# Patient Record
Sex: FEMALE | Race: WHITE | NOT HISPANIC OR LATINO | Employment: FULL TIME | URBAN - METROPOLITAN AREA
[De-identification: names, ages, dates, MRNs, and addresses within clinical notes are randomized per-mention and may not be internally consistent; named-entity substitution may affect disease eponyms.]

---

## 2023-03-02 PROBLEM — J30.2 SEASONAL ALLERGIES: Status: ACTIVE | Noted: 2023-03-02

## 2023-03-02 PROBLEM — K21.9 GASTROESOPHAGEAL REFLUX DISEASE: Status: ACTIVE | Noted: 2023-03-02

## 2023-03-02 PROBLEM — R79.89 ABNORMAL CBC: Status: ACTIVE | Noted: 2023-03-02

## 2023-03-02 PROBLEM — N93.9 ABNORMAL UTERINE BLEEDING: Status: ACTIVE | Noted: 2023-03-02

## 2023-03-02 PROBLEM — G47.00 INSOMNIA: Status: ACTIVE | Noted: 2023-03-02

## 2023-03-02 PROBLEM — N63.10 BREAST MASS, RIGHT: Status: ACTIVE | Noted: 2023-03-02

## 2023-03-02 PROBLEM — U07.1 COVID-19: Status: ACTIVE | Noted: 2023-03-02

## 2023-03-02 PROBLEM — G25.0 ESSENTIAL TREMOR: Status: ACTIVE | Noted: 2023-03-02

## 2023-03-02 PROBLEM — K58.1 IRRITABLE BOWEL SYNDROME WITH CONSTIPATION: Status: ACTIVE | Noted: 2023-03-02

## 2023-03-02 PROBLEM — F40.243 ANXIETY WITH FLYING: Status: ACTIVE | Noted: 2023-03-02

## 2023-03-02 PROBLEM — G43.009 MIGRAINE WITHOUT AURA AND WITHOUT STATUS MIGRAINOSUS, NOT INTRACTABLE: Status: ACTIVE | Noted: 2023-03-02

## 2023-03-02 PROBLEM — R92.8 ABNORMAL FINDING ON BREAST IMAGING: Status: ACTIVE | Noted: 2023-03-02

## 2023-03-02 RX ORDER — ALPRAZOLAM 0.25 MG/1
TABLET ORAL
COMMUNITY
Start: 2022-06-15 | End: 2023-03-15 | Stop reason: ALTCHOICE

## 2023-03-02 RX ORDER — PANTOPRAZOLE SODIUM 40 MG/1
TABLET, DELAYED RELEASE ORAL
COMMUNITY
Start: 2022-06-15 | End: 2023-03-15 | Stop reason: ALTCHOICE

## 2023-03-02 RX ORDER — DICYCLOMINE HYDROCHLORIDE 20 MG/1
TABLET ORAL
COMMUNITY
Start: 2022-06-15 | End: 2023-03-15 | Stop reason: ALTCHOICE

## 2023-03-02 RX ORDER — TRAZODONE HYDROCHLORIDE 50 MG/1
TABLET ORAL
COMMUNITY
Start: 2022-06-15 | End: 2024-05-08 | Stop reason: ALTCHOICE

## 2023-03-02 RX ORDER — SUMATRIPTAN SUCCINATE 100 MG/1
TABLET ORAL
COMMUNITY
Start: 2018-09-05 | End: 2024-05-08 | Stop reason: WASHOUT

## 2023-03-02 RX ORDER — PROPRANOLOL HYDROCHLORIDE 20 MG/1
TABLET ORAL
COMMUNITY
Start: 2016-10-17 | End: 2024-03-27

## 2023-03-02 RX ORDER — MONTELUKAST SODIUM 10 MG/1
TABLET ORAL
COMMUNITY
Start: 2022-06-15 | End: 2024-04-23 | Stop reason: WASHOUT

## 2023-03-14 NOTE — PROGRESS NOTES
"Subjective   Patient ID: Zoe Wong is a 37 y.o. female who presents for Palpitations (About a month ago she started having fluttering in her chest with SOB).    Poc ekg    Last physical:  6/15/22  Has pt had a pap between 12/2022 and now?  When was last mammo?  Last one in [facility] was 5/2021  Last labs- 7/2022 lipid nl, cbc nl; 6/2022 tsh nl, cmp nl        HPI  Fluttering in chest with sob and neck pain and fatigue x 1mon  Every 2-10min; lasts a second  Not feel if doing something only if sitting  No cp or heart racing  Worse with food and stress  radiate to back, neck, jaw, shoulder, 1 arm or both arms  no sudden onset of severe chest or back pain, described as \"tearing\"   no sudden difficulty speaking, facial drooping, confusion, or drooling  bps at home-90-100s/60s  no cold sweats, dizziness/lightheaded, weakness/paralysis 1 side of body, loss of consciousness, n/v, pain with deep breath or cough, vision change, HA, edema  no leg pain or difficulty walking  no fall or injury  no new work or exercise routine  no new stresses  no rash  no heartburn, gas, belching, bloating, abd pain, trouble swallowing  does not hurt to touch chest  FH heart attack or heart surgery or other heart issues-none  does not have a cardio dr  no sx like this before  no diabetes or htn  Before this started saw dentist and local anesthesia and heart rate incr            No care team member to display     Review of Systems   Constitutional:  Negative for chills and fever.   Respiratory:  Positive for shortness of breath. Negative for cough and wheezing.    Cardiovascular:  Positive for palpitations. Negative for chest pain.   Neurological:  Positive for headaches. Negative for dizziness.       Objective   Visit Vitals  /73   Pulse 86   Temp 36.2 °C (97.2 °F)      BP Readings from Last 3 Encounters:   03/15/23 105/73   06/15/22 110/64   11/11/21 115/80     Wt Readings from Last 3 Encounters:   03/15/23 60.7 kg (133 lb 12.8 oz) "   06/15/22 59.9 kg (132 lb 2 oz)   11/11/21 58.1 kg (128 lb)       Physical Exam  Constitutional:       Appearance: Normal appearance.   Cardiovascular:      Rate and Rhythm: Normal rate and regular rhythm.      Heart sounds: Normal heart sounds.   Pulmonary:      Effort: Pulmonary effort is normal.      Breath sounds: Normal breath sounds. No wheezing, rhonchi or rales.   Neurological:      Mental Status: She is alert.         Assessment/Plan   Diagnoses and all orders for this visit:  Palpitations  -     ECG 12 lead  -     Comprehensive Metabolic Panel; Future  -     CBC and Auto Differential; Future  -     TSH with reflex to Free T4 if abnormal; Future  -     Referral to Cardiology; Future  SOB (shortness of breath)  -     XR chest 2 views; Future

## 2023-03-15 ENCOUNTER — LAB (OUTPATIENT)
Dept: LAB | Facility: LAB | Age: 38
End: 2023-03-15
Payer: COMMERCIAL

## 2023-03-15 ENCOUNTER — OFFICE VISIT (OUTPATIENT)
Dept: PRIMARY CARE | Facility: CLINIC | Age: 38
End: 2023-03-15
Payer: COMMERCIAL

## 2023-03-15 VITALS
HEIGHT: 64 IN | WEIGHT: 133.8 LBS | SYSTOLIC BLOOD PRESSURE: 105 MMHG | HEART RATE: 86 BPM | TEMPERATURE: 97.2 F | DIASTOLIC BLOOD PRESSURE: 73 MMHG | BODY MASS INDEX: 22.84 KG/M2

## 2023-03-15 DIAGNOSIS — R00.2 PALPITATIONS: Primary | ICD-10-CM

## 2023-03-15 DIAGNOSIS — R00.2 PALPITATIONS: ICD-10-CM

## 2023-03-15 DIAGNOSIS — R06.02 SOB (SHORTNESS OF BREATH): ICD-10-CM

## 2023-03-15 LAB
ALANINE AMINOTRANSFERASE (SGPT) (U/L) IN SER/PLAS: 14 U/L (ref 7–45)
ALBUMIN (G/DL) IN SER/PLAS: 4.3 G/DL (ref 3.4–5)
ALKALINE PHOSPHATASE (U/L) IN SER/PLAS: 50 U/L (ref 33–110)
ANION GAP IN SER/PLAS: 9 MMOL/L (ref 10–20)
ASPARTATE AMINOTRANSFERASE (SGOT) (U/L) IN SER/PLAS: 17 U/L (ref 9–39)
BASOPHILS (10*3/UL) IN BLOOD BY AUTOMATED COUNT: 0.05 X10E9/L (ref 0–0.1)
BASOPHILS/100 LEUKOCYTES IN BLOOD BY AUTOMATED COUNT: 0.7 % (ref 0–2)
BILIRUBIN TOTAL (MG/DL) IN SER/PLAS: 0.4 MG/DL (ref 0–1.2)
CALCIUM (MG/DL) IN SER/PLAS: 9 MG/DL (ref 8.6–10.3)
CARBON DIOXIDE, TOTAL (MMOL/L) IN SER/PLAS: 29 MMOL/L (ref 21–32)
CHLORIDE (MMOL/L) IN SER/PLAS: 103 MMOL/L (ref 98–107)
CREATININE (MG/DL) IN SER/PLAS: 0.63 MG/DL (ref 0.5–1.05)
EOSINOPHILS (10*3/UL) IN BLOOD BY AUTOMATED COUNT: 0.2 X10E9/L (ref 0–0.7)
EOSINOPHILS/100 LEUKOCYTES IN BLOOD BY AUTOMATED COUNT: 2.8 % (ref 0–6)
ERYTHROCYTE DISTRIBUTION WIDTH (RATIO) BY AUTOMATED COUNT: 13.6 % (ref 11.5–14.5)
ERYTHROCYTE MEAN CORPUSCULAR HEMOGLOBIN CONCENTRATION (G/DL) BY AUTOMATED: 31.3 G/DL (ref 32–36)
ERYTHROCYTE MEAN CORPUSCULAR VOLUME (FL) BY AUTOMATED COUNT: 98 FL (ref 80–100)
ERYTHROCYTES (10*6/UL) IN BLOOD BY AUTOMATED COUNT: 3.92 X10E12/L (ref 4–5.2)
GFR FEMALE: >90 ML/MIN/1.73M2
GLUCOSE (MG/DL) IN SER/PLAS: 77 MG/DL (ref 74–99)
HEMATOCRIT (%) IN BLOOD BY AUTOMATED COUNT: 38.4 % (ref 36–46)
HEMOGLOBIN (G/DL) IN BLOOD: 12 G/DL (ref 12–16)
IMMATURE GRANULOCYTES/100 LEUKOCYTES IN BLOOD BY AUTOMATED COUNT: 0.1 % (ref 0–0.9)
LEUKOCYTES (10*3/UL) IN BLOOD BY AUTOMATED COUNT: 7.1 X10E9/L (ref 4.4–11.3)
LYMPHOCYTES (10*3/UL) IN BLOOD BY AUTOMATED COUNT: 2.39 X10E9/L (ref 1.2–4.8)
LYMPHOCYTES/100 LEUKOCYTES IN BLOOD BY AUTOMATED COUNT: 33.5 % (ref 13–44)
MONOCYTES (10*3/UL) IN BLOOD BY AUTOMATED COUNT: 0.52 X10E9/L (ref 0.1–1)
MONOCYTES/100 LEUKOCYTES IN BLOOD BY AUTOMATED COUNT: 7.3 % (ref 2–10)
NEUTROPHILS (10*3/UL) IN BLOOD BY AUTOMATED COUNT: 3.96 X10E9/L (ref 1.2–7.7)
NEUTROPHILS/100 LEUKOCYTES IN BLOOD BY AUTOMATED COUNT: 55.6 % (ref 40–80)
NRBC (PER 100 WBCS) BY AUTOMATED COUNT: 0 /100 WBC (ref 0–0)
PLATELETS (10*3/UL) IN BLOOD AUTOMATED COUNT: 376 X10E9/L (ref 150–450)
POTASSIUM (MMOL/L) IN SER/PLAS: 4.2 MMOL/L (ref 3.5–5.3)
PROTEIN TOTAL: 7 G/DL (ref 6.4–8.2)
SODIUM (MMOL/L) IN SER/PLAS: 137 MMOL/L (ref 136–145)
THYROTROPIN (MIU/L) IN SER/PLAS BY DETECTION LIMIT <= 0.05 MIU/L: 1.81 MIU/L (ref 0.44–3.98)
UREA NITROGEN (MG/DL) IN SER/PLAS: 12 MG/DL (ref 6–23)

## 2023-03-15 PROCEDURE — 93000 ELECTROCARDIOGRAM COMPLETE: CPT | Performed by: NURSE PRACTITIONER

## 2023-03-15 PROCEDURE — 99214 OFFICE O/P EST MOD 30 MIN: CPT | Performed by: NURSE PRACTITIONER

## 2023-03-15 PROCEDURE — 85025 COMPLETE CBC W/AUTO DIFF WBC: CPT

## 2023-03-15 PROCEDURE — 80053 COMPREHEN METABOLIC PANEL: CPT

## 2023-03-15 PROCEDURE — 1036F TOBACCO NON-USER: CPT | Performed by: NURSE PRACTITIONER

## 2023-03-15 PROCEDURE — 84443 ASSAY THYROID STIM HORMONE: CPT

## 2023-03-15 PROCEDURE — 36415 COLL VENOUS BLD VENIPUNCTURE: CPT

## 2023-03-15 ASSESSMENT — ENCOUNTER SYMPTOMS
COUGH: 0
HEADACHES: 1
PALPITATIONS: 1
WHEEZING: 0
SHORTNESS OF BREATH: 1
CHILLS: 0
DIZZINESS: 0
FEVER: 0

## 2023-03-15 ASSESSMENT — PATIENT HEALTH QUESTIONNAIRE - PHQ9
1. LITTLE INTEREST OR PLEASURE IN DOING THINGS: NOT AT ALL
SUM OF ALL RESPONSES TO PHQ9 QUESTIONS 1 AND 2: 0
2. FEELING DOWN, DEPRESSED OR HOPELESS: NOT AT ALL

## 2023-03-15 NOTE — PATIENT INSTRUCTIONS
EKG today-normal  Chest xray today-results in 2-3d    Labs today-results in 2-3d  Lab orders placed. You can use the lab in our office today. Results will be back in 2-3 business days for most labs. It is always recommended for any orders (labs, xrays, ultrasounds,MRI, ct scan, procedures etc) to check with your insurance provider for expected costs or expenses to you.     Use the lab in our building when fasting; fasting is no food, drink, gum or mints other than water for 8-12 hrs. No appt necessary. Their hrs are: Monday-Thursday 7a-6p, Friday 7a-5p, and Saturday 8a-12p. Results will be back in 2-3 business days for most labs. It is always recommended for any orders (labs, xrays, ultrasounds,MRI, ct scan, procedures etc) to check with your insurance provider for expected costs or expenses to you.     If chest pain continues then I will order a stress test and refer to a cardiologist.    You will get your results via phone from my medical assistant if you do not have [facility].  OR  You will get your results via the [facility] patient portal.    If a result is urgent, I will call to speak to you.        I will communicate with you via [facility] regarding messages and results. If you need help with this, you can call the support line at [phone].    IT WAS A PLEASURE TO SEE YOU TODAY. THANK YOU FOR CHOOSING US FOR YOUR HEALTHCARE NEEDS.

## 2023-08-24 ENCOUNTER — TELEPHONE (OUTPATIENT)
Dept: PRIMARY CARE | Facility: CLINIC | Age: 38
End: 2023-08-24
Payer: COMMERCIAL

## 2023-08-24 NOTE — TELEPHONE ENCOUNTER
Called patient on 8/22/23 to schedule patient for a physical.  She scheduled her  and will call back to schedule herself.

## 2024-03-25 ENCOUNTER — OFFICE VISIT (OUTPATIENT)
Dept: OBSTETRICS AND GYNECOLOGY | Facility: CLINIC | Age: 39
End: 2024-03-25
Payer: COMMERCIAL

## 2024-03-25 VITALS
WEIGHT: 131.9 LBS | DIASTOLIC BLOOD PRESSURE: 66 MMHG | BODY MASS INDEX: 22.52 KG/M2 | SYSTOLIC BLOOD PRESSURE: 93 MMHG | HEIGHT: 64 IN

## 2024-03-25 DIAGNOSIS — Z01.419 PAP SMEAR, AS PART OF ROUTINE GYNECOLOGICAL EXAMINATION: ICD-10-CM

## 2024-03-25 DIAGNOSIS — Z01.419 ENCOUNTER FOR ANNUAL ROUTINE GYNECOLOGICAL EXAMINATION: Primary | ICD-10-CM

## 2024-03-25 DIAGNOSIS — R10.2 PELVIC PAIN: ICD-10-CM

## 2024-03-25 PROCEDURE — 1036F TOBACCO NON-USER: CPT | Performed by: OBSTETRICS & GYNECOLOGY

## 2024-03-25 PROCEDURE — 87624 HPV HI-RISK TYP POOLED RSLT: CPT

## 2024-03-25 PROCEDURE — 88175 CYTOPATH C/V AUTO FLUID REDO: CPT

## 2024-03-25 PROCEDURE — 99395 PREV VISIT EST AGE 18-39: CPT | Performed by: OBSTETRICS & GYNECOLOGY

## 2024-03-25 NOTE — PROGRESS NOTES
Subjective   Patient ID: Zoe Wong is a 38 y.o. female who presents for Annual Exam (Patient here for annual/Birth control= none/Declined chaperone).  HPI  Patient is a 38-year-old  2 para 1-0-1-1 whose had 1 spontaneous vaginal delivery with previous partner.  Her last menstrual period was  she said that typically 1 month apart she will bleed for about 7 days.  She said the first day is very painful she requires ibuprofen the remainder days manageable.  Currently not using anything for birth control.  She and her partner have been trying to get pregnant for about 7 years she does have an appointment with reproductive endocrinology in 2024.  She admits to regular bowel movements denies any urinary symptoms.  Status post appendectomy tonsils and adenoids and hemorrhoid surgery.  She denies cigarette smoking currently works as an RN.  Family history significant for maternal grandmother with breast cancer.  Patient states her Pap smears have been normal.  Patient states when growing up in [place] a physician told her she may have endometriosis.  She also states in her 20s was diagnosed with a pelvic infection possibly ovarian infection and treated with antibiotics.  Review of Systems    Objective   Physical Exam  Gen.: Alert and in no acute distress. Well-developed, well-nourished.  Thyroid: Nonenlarged and no palpable thyroid nodules  Cardiovascular: Heart regular rate and rhythm  Pulmonary: Clear bilateral breath sounds  Breasts: Normal appearance, no nipple discharge and no skin changes. No palpable masses and no axillary lymphadenopathy  Abdomen: Soft and nontender, no abdominal mass palpated, no organomegaly   Pelvic: External genitalia normal, Bartholin's urethral and Grandfield's glands normal. Vagina normal. Cervix normal. Uterus normal size and shape. Right adnexa normal without masses. Left adnexa normal without masses. Perianal area and normal.  Assessment/Plan   Annual GYN exam, Pap and  HPV done, will get pelvic ultrasound due to 1 year history of pelvic pain and bloating.  Will contact patient with results.  Patient will follow-up for her reproductive endocrinology consultation.         Kory Bacon MD 03/25/24 4:31 PM

## 2024-03-26 DIAGNOSIS — G25.0 ESSENTIAL TREMOR: ICD-10-CM

## 2024-03-27 RX ORDER — PROPRANOLOL HYDROCHLORIDE 20 MG/1
20 TABLET ORAL 2 TIMES DAILY
Qty: 180 TABLET | Refills: 1 | Status: SHIPPED | OUTPATIENT
Start: 2024-03-27 | End: 2024-05-08 | Stop reason: SDUPTHER

## 2024-04-10 ENCOUNTER — HOSPITAL ENCOUNTER (OUTPATIENT)
Dept: RADIOLOGY | Facility: HOSPITAL | Age: 39
Discharge: HOME | End: 2024-04-10
Payer: COMMERCIAL

## 2024-04-10 DIAGNOSIS — R10.2 PELVIC PAIN: ICD-10-CM

## 2024-04-10 PROCEDURE — 76830 TRANSVAGINAL US NON-OB: CPT | Performed by: RADIOLOGY

## 2024-04-10 PROCEDURE — 76856 US EXAM PELVIC COMPLETE: CPT | Performed by: RADIOLOGY

## 2024-04-10 PROCEDURE — 76856 US EXAM PELVIC COMPLETE: CPT

## 2024-04-23 ENCOUNTER — CONSULT (OUTPATIENT)
Dept: ENDOCRINOLOGY | Facility: CLINIC | Age: 39
End: 2024-04-23
Payer: COMMERCIAL

## 2024-04-23 VITALS
WEIGHT: 132.4 LBS | HEIGHT: 65 IN | DIASTOLIC BLOOD PRESSURE: 74 MMHG | SYSTOLIC BLOOD PRESSURE: 106 MMHG | HEART RATE: 67 BPM | BODY MASS INDEX: 22.06 KG/M2

## 2024-04-23 DIAGNOSIS — Z01.83 ENCOUNTER FOR RH BLOOD TYPING: ICD-10-CM

## 2024-04-23 DIAGNOSIS — N83.209 CYST OF OVARY, UNSPECIFIED LATERALITY: ICD-10-CM

## 2024-04-23 DIAGNOSIS — Z11.3 SCREENING FOR STDS (SEXUALLY TRANSMITTED DISEASES): ICD-10-CM

## 2024-04-23 DIAGNOSIS — Z11.59 ENCOUNTER FOR SCREENING FOR OTHER VIRAL DISEASES: ICD-10-CM

## 2024-04-23 DIAGNOSIS — Z01.812 ENCOUNTER FOR PREPROCEDURAL LABORATORY EXAMINATION: ICD-10-CM

## 2024-04-23 DIAGNOSIS — Z13.29 SCREENING FOR THYROID DISORDER: Primary | ICD-10-CM

## 2024-04-23 DIAGNOSIS — Z31.41 FERTILITY TESTING: ICD-10-CM

## 2024-04-23 LAB
ABO GROUP (TYPE) IN BLOOD: NORMAL
ANTIBODY SCREEN: NORMAL
BB ANTIBODY IDENTIFICATION: NORMAL
CASE #: NORMAL
HBV SURFACE AG SERPL QL IA: NONREACTIVE
HCV AB SER QL: NONREACTIVE
HIV 1+2 AB+HIV1 P24 AG SERPL QL IA: NONREACTIVE
RH FACTOR (ANTIGEN D): NORMAL
RUBV IGG SERPL IA-ACNC: 2.3 IA
RUBV IGG SERPL QL IA: POSITIVE
TREPONEMA PALLIDUM IGG+IGM AB [PRESENCE] IN SERUM OR PLASMA BY IMMUNOASSAY: NONREACTIVE
TSH SERPL-ACNC: 1.32 MIU/L (ref 0.44–3.98)
VARICELLA ZOSTER IGG INDEX: 1.8 IA
VZV IGG SER QL IA: POSITIVE

## 2024-04-23 PROCEDURE — 87340 HEPATITIS B SURFACE AG IA: CPT

## 2024-04-23 PROCEDURE — 99214 OFFICE O/P EST MOD 30 MIN: CPT | Performed by: NURSE PRACTITIONER

## 2024-04-23 PROCEDURE — 86870 RBC ANTIBODY IDENTIFICATION: CPT

## 2024-04-23 PROCEDURE — 87800 DETECT AGNT MULT DNA DIREC: CPT

## 2024-04-23 PROCEDURE — 86787 VARICELLA-ZOSTER ANTIBODY: CPT

## 2024-04-23 PROCEDURE — 86905 BLOOD TYPING RBC ANTIGENS: CPT

## 2024-04-23 PROCEDURE — 86850 RBC ANTIBODY SCREEN: CPT

## 2024-04-23 PROCEDURE — 99204 OFFICE O/P NEW MOD 45 MIN: CPT | Performed by: NURSE PRACTITIONER

## 2024-04-23 PROCEDURE — 87389 HIV-1 AG W/HIV-1&-2 AB AG IA: CPT

## 2024-04-23 PROCEDURE — 83516 IMMUNOASSAY NONANTIBODY: CPT

## 2024-04-23 PROCEDURE — 86317 IMMUNOASSAY INFECTIOUS AGENT: CPT

## 2024-04-23 PROCEDURE — 36415 COLL VENOUS BLD VENIPUNCTURE: CPT

## 2024-04-23 PROCEDURE — 86803 HEPATITIS C AB TEST: CPT

## 2024-04-23 PROCEDURE — 86900 BLOOD TYPING SEROLOGIC ABO: CPT

## 2024-04-23 PROCEDURE — 86901 BLOOD TYPING SEROLOGIC RH(D): CPT

## 2024-04-23 PROCEDURE — 86780 TREPONEMA PALLIDUM: CPT

## 2024-04-23 PROCEDURE — 84443 ASSAY THYROID STIM HORMONE: CPT

## 2024-04-23 RX ORDER — DICYCLOMINE HYDROCHLORIDE 20 MG/1
1 TABLET ORAL 4 TIMES DAILY
COMMUNITY
Start: 2022-06-15

## 2024-04-23 RX ORDER — ZOLMITRIPTAN 5 MG/1
5 TABLET, FILM COATED ORAL ONCE AS NEEDED
COMMUNITY
Start: 2023-05-10

## 2024-04-23 ASSESSMENT — LIFESTYLE VARIABLES
TOBACCO_USE: NO
HISTORY_ALCOHOL_USE: NO

## 2024-04-23 NOTE — PROGRESS NOTES
Visit Type: In Person    NEW FERTILITY PATIENT VISIT    Referred by: Referred by:: Dr Pacheco    Accompanied today by:      Zoe Wong is a 38 y.o.  female who presents with Please tell us your reason for this visit today: Infertility    Have been trying 6 years    Have you had any concerns about your fertility treatments so far? Have you had any concerns about your fertility treatments so far: Didnt do any     What are you goals for today's visit?   Try to conceive  What causes of infertility have been identified on your workup so far? What causes of infertility have been identified on your workup so far: None     Past Infertility Treatments: Have you undergone any treatments for fertility: No      Please summarize your fertility treatments to date.   N/A    PRIOR EVALUATION / TREATMENT  none  Hysterosalpingogram: n/a  Saline Infused Sonography: n/a  GYN Pelvic Ultrasound:   FINDINGS:  UTERUS:  The uterus measures 5.0 x 5.5 x 10.1cm in greatest transverse, AP,  and sagittal dimensions. Heterogeneous in echogenicity with at least  1 fibroid in the anterior myometrium to the right of midline near the  fundus measuring up to 3.8 cm in size.      ENDOMETRIUM:  The central endometrial complex measures  1.2 cm in dual layer  thickness.  Endometrium is heterogeneous in echogenicity. There is  some fluid within the endometrial cavity near the fundus. There is a  questionable 8 mm hyperechoic nodule involving the posterior aspect  of the endometrium near the fundus.      RIGHT ADNEXA:  The right ovary measures  2.8 cm x 1.8 cm x 3.1 cmcm and demonstrates  flow on Doppler imaging.  It appears sonographically unremarkable.      LEFT ADNEXA:  The left ovary measures  2.6 cm x 2.5 cm x 3.0 cm cm and demonstrates  flow on Doppler imaging.  It appears sonographically unremarkable.      CUL DE SAC:  Small amount of fluid in the pelvis which is most likely physiologic.      IMPRESSION:  Questionable 8 mm hyperechoic  nodule involving the posterior  endometrial near the level of the fundus. There is nonspecific but  may relate to a polyp, submucosal fibroid, or endometrial  hyperplasia. Follow-up is recommended for re-evaluation. Fibroid  uterus. Small amount of fluid in the pelvis which is most likely  physiologic.  Other:  n/a  Prior Labs  Lab Results    Date Done      AMH: No results found for requested labs within last 1825 days. No results found for requested labs within last 1825 days.   TSH: 1.81 (Ref range: 0.44 - 3.98 mIU/L) 3/15/2023   PRL: 3.2 (L; Ref range: 6.0 - 20.0 ug/L) 12/9/2019   Testosterone: No results found for requested labs within last 1825 days. No results found for requested labs within last 1825 days.   DHEAS: 161 (Ref range: 12 - 379 ug/dL) 12/9/2019   FSH: 5.1 (Ref range: IU/L) 12/9/2019   17 OHP: No results found for requested labs within last 1825 days. No results found for requested labs within last 1825 days.   HgbA1c: No results found for requested labs within last 1825 days. No results found for requested labs within last 1825 days.   Hepatitis B surface antigen: No results found for requested labs within last 1825 days. No results found for requested labs within last 1825 days.   Hepatitis C antibody: No results found for requested labs within last 1825 days. No results found for requested labs within last 1825 days.   HIV ½ Antigen Antibody screen with reflex: No results found for requested labs within last 1825 days. No results found for requested labs within last 1825 days.   Syphilis screening with reflex: No results found for requested labs within last 1825 days. No results found for requested labs within last 1825 days.   GC: NEGATIVE (Ref range: Negative) 12/9/2019   CT: NEGATIVE (Ref range: Negative) 12/9/2019   Type and Screen: No results found for requested labs within last 1825 days. No results found for requested labs within last 1825 days.   Rh: No results found for requested labs  within last 1825 days. No results found for requested labs within last 1825 days.   Antibody: No results found for requested labs within last 1825 days. No results found for requested labs within last 1825 days.   Rubella: POSITIVE (Ref range: ) 6/15/2022   Varicella: POSITIVE (Ref range: NEGATIVE) 6/15/2022   Hemoglobin: No results found for requested labs within last 1825 days. No results found for requested labs within last 1825 days.   Hematocrit: No results found for requested labs within last 1825 days. No results found for requested labs within last 1825 days.   Creatinine: 0.63 (Ref range: 0.50 - 1.05 mg/dL) 3/15/2023   AST:17 (Ref range: 9 - 39 U/L) 3/15/2023   ALT:14 (Ref range: 7 - 45 U/L): 3/15/2023        Relationship Status:       OB Hx     OB History          2    Para   1    Term   0       0    AB   1    Living   0         SAB   1    IAB   0    Ectopic   0    Multiple   0    Live Births   1           Obstetric Comments   -  son who is 15 years old, , gdm, induced at 39 weeks, jaundice- NICU 19 days for severe jaundice and had blood transfusions,     1 SAB prior - D&C               GYN HISTORY   Have you ever been diagnosed with a sexually transmitted disease? Have you ever been diagnosed with a sexually transmitted disease?: No    Please select all that are applicable:   N/A  Have you ever had Pelvic Inflammatory Disease? Have you ever had Pelvic Inflammatory Disease?: No    Have you had an abnormal PAP smear? Have you had an abnormal PAP smear?: No    Date & Result of last PAP smear:   Lab Results   Component Value Date    FINALINTERP  2024         A. THINPREP PAP CERVIX, SCREENING -     Specimen Adequacy  Satisfactory for evaluation; endocervical/transformation zone component is present    General Categorization  Negative for intraepithelial lesion or malignancy.    Descriptive Interpretation  Negative for intraepithelial lesion or malignancy              Have  you ever had an abnormal Mammogram? Have you ever had an abnormal mammogram?: Yes    Date & result of your last mammogram: Date of last mammogram:: 2021  Patient told to start at age 40 unless anything is palpated.    Do you have pelvic pain? Do you have pelvic pain?: No    How many times per week do you have intercourse? If applicable, how many times a week are you having intercourse, trying to get pregnant during your fertile window?: 3    Do you have pain with intercourse? Do you have pain with intercourse?: No    Do you use lubricants with intercourse?    Do you have pain with bowel movements? Do you have pain with bowel movements?: No              Do you have pain with a full bladder? Do you have pain with a full bladder?: No    MENSTRUAL HISTORY  LMP: When was your last menstrual period?: Other  4/15/24    Menarche: If applicable, when was your first occurrence of menstruation?: 15 yo    Contraception: What (if any) type of birth control do you currently use?: None    Cycle length: What is the average number of days between menstrual cycles?: 28    Describe your bleeding: Describe your bleeding:: Average  Last 3 months have had some spotting around ovulation.    Dysmenorrhea: Are your menstrual periods painful?: Yes  Takes motrin q 4 hrs. Pretty significant.      ENDOCRINE/INFERTILITY HISTORY  Duration of infertility: If applicable, what is the approximate date you began trying to get pregnant?: More than 5 years    Coital Activity/week: If applicable, how many times a week are you having intercourse, trying to get pregnant during your fertile window?: 3    Nipple Discharge: Do you experience any loss of milk or liquid discharge from the breasts?: No    Vision changes: Are you experiencing any vision changes?: No    Headaches: Are you experiencing headaches?: Yes    Excess hair growth: Are you experiencing persistent or worsening hair growth on the face, breasts or lower abdomen?: No    Excessive hair loss:  Are you experiencing loss of hair from your scalp?: Yes  3-4 years    Acne: Are you experiencing acne?: No    Oily skin: Oily skin?: Yes    Recent weight change  Weight gain: Are you experiencing any increase in weight?: No    Weight loss: Are you experiencing any decrease in weight?: No    Exercise more than 3 times a week: Exercise more than 3 times a week?: Yes      PMH  Past Medical History:   Diagnosis Date    Allergy status to unspecified drugs, medicaments and biological substances     History of seasonal allergies    Anxiety     Encounter for screening for infections with a predominantly sexual mode of transmission 12/09/2019    Screen for STD (sexually transmitted disease)    Headache     Migraines     Personal history of other infectious and parasitic diseases     History of infection due to human papilloma virus (HPV)    Personal history of other specified conditions 11/11/2021    History of insomnia    Personal history of urinary (tract) infections     History of bladder infections        MEDICATIONS  Current Outpatient Medications on File Prior to Visit   Medication Sig Dispense Refill    dicyclomine (Bentyl) 20 mg tablet Take 1 tablet (20 mg) by mouth 4 times a day.      loratadine (CLARITIN ORAL) Take 1 tablet by mouth once daily as needed.      propranolol (Inderal) 20 mg tablet TAKE 1 TABLET BY MOUTH TWICE A  tablet 1    SUMAtriptan (Imitrex) 100 mg tablet TAKE 1 TABLET AT ONSET OF MIGRAINE HEADACHE.  MAY REPEAT IN 2 HOURS IF NEEDED.      traZODone (Desyrel) 50 mg tablet TAKE 1/2-2 TABLETS AT BEDTIME AS NEEDED FOR SLEEP.      ZOLMitriptan (Zomig) 5 mg tablet Take 1 tablet (5 mg) by mouth 1 time if needed.      [DISCONTINUED] montelukast (Singulair) 10 mg tablet TAKE 1 TABLET BY MOUTH EVERY DAY       No current facility-administered medications on file prior to visit.        PSH  Past Surgical History:   Procedure Laterality Date    ADENOIDECTOMY  11/09/2016    Adenoidectomy    APPENDECTOMY   2016    Appendectomy    DILATION AND CURETTAGE OF UTERUS      HEMORRHOID SURGERY  2016    Hemorrhoidectomy    OTHER SURGICAL HISTORY  2022    Breast biopsy        PSYCH HISTORY  Have you ever been diagnosed with a mental health Issue?: No    Have you ever been hospitalized for a mental health disorder?: No       SOCIAL HISTORY  Social History     Tobacco Use    Smoking status: Never    Smokeless tobacco: Never   Substance Use Topics    Alcohol use: Yes     Alcohol/week: 2.0 standard drinks of alcohol     Types: 2 Glasses of wine per week     Comment: social    Drug use: Never     Occupation: Your occupation:: RN    Have you ever been incarcerated? Have you ever been incarcerated?: No    Do you have a history of domestic violence? Do you have a history of domestic violence?: No    Do you feel safe at home? Do you feel safe at home?: Yes    Do you have a history of any negative sexual experience such as incest or rape? Do you have a history of any negative sexual experience such as incest or rape?: No       PARTNER HISTORY  Partner Name: Partner name:: Milton Conway    : Partner :: 85    Occupation: Partner occupation:: Track     Prior fertility history: Partner Prior Fertility History:: No childer    PMH: Partner Past Medical History:: None    PSH: Partner Past Surgical History:: Unsure    Smoking:Partner: Recent or current tobacco use: No    Alcohol Use: Partner: Recent or current alcohol use: No    Drug Use: Partner: Recent or current drug use: No    Medications:    Injuries: Partner: Any history of surgeries or injuries in the reproductive area?: Yes    STD: Have you ever been diagnosed with a sexually transmitted disease?: No    Please select all that are applicable:    SA: Prior Semen Analysis completed?: No    SA Results:   N/A    FAMILY HISTORY  Family History   Problem Relation Name Age of Onset    Hypertension Mother Praveena     Hyperthyroidism Mother Praveena      "Diabetes Father Ion     Hypertension Father Ion     Breast cancer Maternal Grandmother         CANCER HISTORY    Breast: Breast Cancer: Please answer Yes, No or Unsure. If Yes, please, identify which family member.: Grandmother  Maternal    Ovarian: Ovarian Cancer: Please answer Yes, No or Unsure. If Yes, please, identify which family member.: No    Colon: Colon Cancer: Please answer Yes, No or Unsure. If Yes, please, identify which family member.: No  Pancreatic cancer on paternal side age 84    Endometrial: Endometrial Cancer: Please answer Yes, No or Unsure. If Yes, please, identify which family member.: No      FAMILY VTE HISTORY  Family History of Blood Clots: Blood Clots: Please answer Yes, No or Unsure. If Yes, please, identify which family member.: No      GENETIC HISTORY  Ethnic Background  Patient:   white  Partner:  white  Genetic Disease in Family  Patient: Patient: Defects and genetic syndromes? Please answer Yes, No or Unsure: No    Partner: Partner: Defects and genetic syndromes? Please answer Yes, No or Unsure: No    Birth Defects in Family  Patient: Patient: Birth defects? Please answer Yes, No or Unsure: No    Partner: Partner: Birth defects? Please answer Yes, No or Unsure: No    Genetic screening performed previously:  none     BMI:   BMI Readings from Last 1 Encounters:   24 22.33 kg/m²     VITALS:  /74   Pulse 67   Ht 1.64 m (5' 4.57\")   Wt 60.1 kg (132 lb 6.4 oz)   LMP 04/15/2024 (Exact Date)   BMI 22.33 kg/m²     ASSESSMENT   38 y.o.  female with  secondary infertility x 6,  and the following pertinent medical issues: hx of appendectomy, hx of severe dymenorrhea for 4 months .  Partner SA: No Assessment    COUNSELING  We discussed causes of infertility including hormonal, egg quality issues, structural problems such as endometriosis, adhesions, or tubal problems, uterine factors such as polyps or fibroids, and sperm issues. Reviewed evaluation of such as well. We " discussed various methods for achieving pregnancy in some detail including, ovulation induction, insemination, superovulation and IVF.    INSTRUCTIONS FOR INFERTILITY TESTING    Semen Analysis  The semen samples that you have been asked to submit are important for diagnostic and therapeutic purposes.  Please abstain from ejaculating 2-3 days prior to collecting the sample.  The sample should be produced by masturbation.  You will need to collect the ejaculate into the container supplied by the [facility] (you can get containers at your doctor's office).  Do not use other plastic containers from home such as Tupperware as these containers as they may interfere with the test results.  Lubricants and saliva also interfere with test results.  If a collection condom is necessary, please request one at the Andrology lab and they will provide you with an appropriate collection condom.  It is extremely important that the entire sample is collected in the container as the first few drops of ejaculate contain a major portion of sperm.  If you do not collect the entire specimen, please inform the technologist.      You can collect at home as long as you can get the sample to the [facility] lab within 1 hour of collecting.  Please mckayla your name and time of collection on the container. You should carry the container close to your body.  Collection rooms are available at all locations as well.     An appointment is needed to ensure the lab has enough time to process your specimen.  Please call [phone] to schedule this appointment.     Hysterosalpingogram (HSG or x-ray dye test)  An HSG is a test used to make sure your fallopian tubes are not blocked.  This test can only be done between cycle days 5-11, so it is important for you to call as soon as your period starts to schedule an appointment.  You should take ibuprofen 30 minutes before your appointment as this test can cause transient cramping.      If you are  allergic to iodine or shellfish, please inform the . Please call [phone] to schedule this appointment.      PELVIC ULTRASOUNDS  Please call [phone] to schedule this appointment.      Continue to take a prenatal vitamin daily (800mcg or 0.8mg folic acid)    Please reach out to the office for a plan once all the testing is complete- I am not notified when everything on this list is complete, so please call the office directly to check in. The office number is [phone].    If you are interested in integrative medicine to support your care, please contact [facility] at [phone]. Services available include acupuncture, integrative medicine consultations, massage, meditation and stress management. For acupuncture specifically, please ask for Jenni Montgomery (east side) or Mgada Coley (west side).     We also offer a virtual support group every Monday from 6pm-7pm. For more info please email:    For more information and to RSVP, email [email or web address]       If you have any questions and wish to review with a member of our team, please do CALL THE OFFICE during business hours. The office number is [phone].     We discussed the impact of age on fertility. We discussed that a woman is born with all of the follicles that she will have in her lifetime and that these numbers progressively decrease as the patient reaches menopause. We discussed that women can remain fertile into their late 30’s and even early 40’s, however, chance for success is significantly lower for women who have infertility. We also discussed the higher rates of aneuploidy and miscarriage that occur as women age.    Routine Testing  Fertility Center  STDs Within 1 year   Genetic carrier Waiver/Completed   T&S Within 1 year   AMH Within 1 year   TSH Within 1 year   Rubella/Varicella Within 5 years     PLAN  Orders Placed This Encounter   Procedures    Hysterosalpingogram  (HSG)    US pelvis transvaginal    FL hysterosalpingogram    Antimullerian Hormone (Amh)    TSH with reflex to Free T4 if abnormal    Type And Screen    Rubella Antibody, Igg    Varicella Zoster Antibody, Igg    Hepatitis B surface antigen    Hepatitis C Antibody    HIV 1/2 Antigen/Antibody Screen with Reflex to Confirmation    Syphilis Screen with Reflex    C. Trachomatis / N. Gonorrhoeae, Amplified Detection    POCT pregnancy, urine manually resulted       GENETIC SCREENING PATIENT  Reviewed and will decide    PARTNER  Yes Semen Analysis: ordered already  NA    FOLLOW UP   Consults:  N/A  Chart to primary nurse for care coordination and patient check list/education  Enroll in Engaged MD  Take prenatal vitamins, vitamin D 2000 IUs daily  Discussed that PAP and mammogram must be updated if appropriate based on age and clinical history and results received before treatment can begin  Discussed that treatment cannot proceed until checklist items are complete   6 week follow up with ROXANNE  Additional testing for BMI < 18 or > 40: No  Partner to schedule virtual visit for STD's to be ordered.    MD Completion:  Ectopic Risk: Yes appendectomy  Medically Complex: No    Fertility Plan Update: clomid/IUI    Yennifer Tripp  04/23/2024  2:19 PM

## 2024-04-24 LAB
C TRACH RRNA SPEC QL NAA+PROBE: NEGATIVE
N GONORRHOEA DNA SPEC QL PROBE+SIG AMP: NEGATIVE

## 2024-04-26 LAB — MIS SERPL-MCNC: 4.48 NG/ML (ref 0.18–11.71)

## 2024-05-08 ENCOUNTER — OFFICE VISIT (OUTPATIENT)
Dept: NEUROLOGY | Facility: CLINIC | Age: 39
End: 2024-05-08
Payer: COMMERCIAL

## 2024-05-08 VITALS
BODY MASS INDEX: 22.72 KG/M2 | DIASTOLIC BLOOD PRESSURE: 73 MMHG | HEIGHT: 64 IN | SYSTOLIC BLOOD PRESSURE: 116 MMHG | WEIGHT: 133.1 LBS | HEART RATE: 73 BPM

## 2024-05-08 DIAGNOSIS — G43.009 MIGRAINE WITHOUT AURA AND WITHOUT STATUS MIGRAINOSUS, NOT INTRACTABLE: Primary | ICD-10-CM

## 2024-05-08 DIAGNOSIS — G25.0 ESSENTIAL TREMOR: ICD-10-CM

## 2024-05-08 PROCEDURE — 99213 OFFICE O/P EST LOW 20 MIN: CPT | Performed by: PSYCHIATRY & NEUROLOGY

## 2024-05-08 PROCEDURE — 1036F TOBACCO NON-USER: CPT | Performed by: PSYCHIATRY & NEUROLOGY

## 2024-05-08 RX ORDER — NORTRIPTYLINE HYDROCHLORIDE 10 MG/1
10 CAPSULE ORAL NIGHTLY
Qty: 90 CAPSULE | Refills: 3 | Status: SHIPPED | OUTPATIENT
Start: 2024-05-08 | End: 2025-05-08

## 2024-05-08 RX ORDER — PROPRANOLOL HYDROCHLORIDE 20 MG/1
20 TABLET ORAL DAILY
Qty: 90 TABLET | Refills: 3 | Status: SHIPPED | OUTPATIENT
Start: 2024-05-08 | End: 2025-05-08

## 2024-05-08 ASSESSMENT — ENCOUNTER SYMPTOMS
TREMORS: 1
HEADACHES: 1
SLEEP DISTURBANCE: 1

## 2024-05-08 ASSESSMENT — PATIENT HEALTH QUESTIONNAIRE - PHQ9
SUM OF ALL RESPONSES TO PHQ9 QUESTIONS 1 AND 2: 0
2. FEELING DOWN, DEPRESSED OR HOPELESS: NOT AT ALL
1. LITTLE INTEREST OR PLEASURE IN DOING THINGS: NOT AT ALL

## 2024-05-08 ASSESSMENT — PAIN SCALES - GENERAL: PAINLEVEL: 0-NO PAIN

## 2024-05-08 NOTE — PROGRESS NOTES
"Subjective   Zoe Wong is a 38 y.o. female who comes in for follow up of tremor.    She reports that she did not tolerate the propranolol twice a day. She has headaches when she can't sleep.  For the last 2-3 months her sleep has gotten much better. If she stays up late then she has more headaches.  She has finished school now and she is getting 1-3 a migraines per week. She takes ibuprofen works for them.     He gets mild dizziness when she moves her eyes to the side.           Review of Systems   Neurological:  Positive for tremors and headaches.   Psychiatric/Behavioral:  Positive for sleep disturbance.    All other systems reviewed and are negative.      Objective   /73 (BP Location: Right arm, Patient Position: Sitting, BP Cuff Size: Adult)   Pulse 73   Ht 1.626 m (5' 4\")   Wt 60.4 kg (133 lb 1.6 oz)   LMP 04/15/2024 (Exact Date)   BMI 22.85 kg/m²   Neurological Exam  Physical Exam        Assessment/Plan   Ms. Wong is a 38 year old woman presenting for follow up of essential tremor and migraines.  Tremor is well controlled on propranolol 20 mg daily. She has not tolerated higher doses. Headaches are still 1-3 per week and related to poor sleep. Will trial nortriptyline. Discussed both medications should be stopped if decides to get pregnant.    Follow up in 1 year or sooner if needed.    Winifred Barajas, DO  "

## 2024-05-09 ENCOUNTER — APPOINTMENT (OUTPATIENT)
Dept: OBSTETRICS AND GYNECOLOGY | Facility: CLINIC | Age: 39
End: 2024-05-09
Payer: COMMERCIAL

## 2024-05-14 ENCOUNTER — TELEPHONE (OUTPATIENT)
Dept: ENDOCRINOLOGY | Facility: CLINIC | Age: 39
End: 2024-05-14
Payer: COMMERCIAL

## 2024-05-14 DIAGNOSIS — Z01.812 ENCOUNTER FOR PREPROCEDURAL LABORATORY EXAMINATION: ICD-10-CM

## 2024-05-14 DIAGNOSIS — Z31.41 FERTILITY TESTING: ICD-10-CM

## 2024-05-14 NOTE — TELEPHONE ENCOUNTER
Patient called to schedule HSG, no openings in her window and will be out of town during next cycle. Review with GIBRAN Tripp NP who advises SIS with bubble test this cycle if opening available. FD notified to contact patient to schedule.  Tasha Culp 05/14/24 9:51 AM

## 2024-05-14 NOTE — TELEPHONE ENCOUNTER
Reason for call: Call Back  Notes: Pt called to schedule HSG but there is no availability within her window. She will be traveling next month and will have to delay her HSG for two months. Is there an alternate test that can be done in its place?

## 2024-05-17 ENCOUNTER — APPOINTMENT (OUTPATIENT)
Dept: ENDOCRINOLOGY | Facility: CLINIC | Age: 39
End: 2024-05-17
Payer: COMMERCIAL

## 2024-05-21 ENCOUNTER — ANCILLARY PROCEDURE (OUTPATIENT)
Dept: ENDOCRINOLOGY | Facility: CLINIC | Age: 39
End: 2024-05-21
Payer: COMMERCIAL

## 2024-05-21 DIAGNOSIS — Z31.41 FERTILITY TESTING: ICD-10-CM

## 2024-05-21 DIAGNOSIS — Z01.812 ENCOUNTER FOR PREPROCEDURAL LABORATORY EXAMINATION: ICD-10-CM

## 2024-05-21 LAB — PREGNANCY TEST URINE, POC: NEGATIVE

## 2024-05-21 PROCEDURE — 76856 US EXAM PELVIC COMPLETE: CPT | Performed by: OBSTETRICS & GYNECOLOGY

## 2024-05-21 PROCEDURE — 76831 ECHO EXAM UTERUS: CPT | Performed by: NURSE PRACTITIONER

## 2024-05-21 PROCEDURE — 81025 URINE PREGNANCY TEST: CPT | Performed by: NURSE PRACTITIONER

## 2024-06-14 NOTE — PROGRESS NOTES
Patient ID: Zoe Wong is a 38 y.o. female.    Sonohysterogram    Date/Time: 6/13/2024 9:11 PM    Performed by: LOUISE Sage  Authorized by: LOUISE Sage    Consent:     Consent obtained:  Verbal and written    Consent given by:  Patient    Procedural risks discussed:  Bleeding, infection, possible continued pain and repeat procedure    Patient questions answered: yes      Patient agrees, verbalizes understanding, and wants to proceed: yes      Instructions and paperwork completed: yes    Pre-procedure:     Pre-procedure timeout performed: yes      Prepped with: Betadine    Procedure:     Cervix cleaned and prepped: yes      Cervix dilated: no      Tenaculum applied to cervix: no      Uterus sounded: no      Catheter inserted: yes      Uterine cavity distended with saline: yes    Post-procedure:     No complications: no      Estimated blood loss (mL): minimal.    Post procedure instructions given to patient: yes      Patient tolerated procedure well with no complications: yes    Comments:      Prior to the procedure, the patient was counseled regarding risks, benefits, and alternatives.    Saline Ultrasound Findings:  Intramural fibroid noted approximately 2 cm, does not appear to impact uterine cavity. Unremarkable uterine cavity upon instillation of saline    Bubble Test performed: No  Additional Findings:   Follow up:  No further follow up required.    Wilma Warner 06/13/24 9:11 PM

## 2024-07-12 DIAGNOSIS — Z01.812 ENCOUNTER FOR PREPROCEDURAL LABORATORY EXAMINATION: Primary | ICD-10-CM

## 2024-07-15 ENCOUNTER — HOSPITAL ENCOUNTER (OUTPATIENT)
Dept: RADIOLOGY | Facility: HOSPITAL | Age: 39
Discharge: HOME | End: 2024-07-15
Payer: COMMERCIAL

## 2024-07-15 ENCOUNTER — ANCILLARY PROCEDURE (OUTPATIENT)
Dept: ENDOCRINOLOGY | Facility: CLINIC | Age: 39
End: 2024-07-15
Payer: COMMERCIAL

## 2024-07-15 DIAGNOSIS — Z31.41 FERTILITY TESTING: Primary | ICD-10-CM

## 2024-07-15 DIAGNOSIS — Z31.41 FERTILITY TESTING: ICD-10-CM

## 2024-07-15 DIAGNOSIS — Z01.812 ENCOUNTER FOR PREPROCEDURAL LABORATORY EXAMINATION: ICD-10-CM

## 2024-07-15 LAB — PREGNANCY TEST URINE, POC: NEGATIVE

## 2024-07-15 PROCEDURE — 74740 X-RAY FEMALE GENITAL TRACT: CPT

## 2024-07-15 PROCEDURE — 2550000001 HC RX 255 CONTRASTS: Performed by: NURSE PRACTITIONER

## 2024-07-15 PROCEDURE — 58340 CATHETER FOR HYSTEROGRAPHY: CPT

## 2024-07-15 PROCEDURE — 81025 URINE PREGNANCY TEST: CPT

## 2024-07-15 PROCEDURE — 74740 X-RAY FEMALE GENITAL TRACT: CPT | Performed by: OBSTETRICS & GYNECOLOGY

## 2024-07-15 NOTE — PROGRESS NOTES
Patient ID: Zoe Wong is a 39 y.o. female.    HSG    Date/Time: 7/15/2024 7:22 AM    Performed by: LOUISE Harrington  Authorized by: LOUISE Harrington    Consent:     Consent obtained:  Verbal and written    Consent given by:  Patient    Risks, benefits, and alternatives were discussed: yes      Risks discussed:  Bleeding, infection and pain  Universal protocol:     Procedure explained and questions answered to patient or proxy's satisfaction: yes      Relevant documents present and verified: yes      Test results available: yes      Imaging studies available: yes      Required blood products, implants, devices, and special equipment available: yes      Immediately prior to procedure, a time out was called: yes      Patient identity confirmed:  Verbally with patient, arm band and hospital-assigned identification number  Indications:     Indications:  Fertility testing  Pre-procedure details:     Skin preparation:  Povidone-iodine  Sedation:     Sedation type:  None  Anesthesia:     Anesthesia method:  None  Procedure specific details:      Assistant: STEVE Pruitt CNP performed this procedure      Hysterosalpingogram (HSG) risks, benefits, alternatives, and personnel discussed with patient who agreed to proceed.    Procedural time out        Done in room where procedure done: Yes        Done just before starting procedure: Yes                                                 All members of procedural team involved in time-out: Yes                  Active communication used: Yes                                                         All team members agreed on procedure: Yes                                        Patient correctly identified by two identifiers: Yes                                  Correct side and site identified: Yes                                                     All needed special equipment/instruments available: Yes               Prior to the start of the procedure a time out was  taken and the following were verified: The identity of the patient using two patient identifiers.   Urine pregnancy test was performed and was negative.   Risks, benefits, and alternatives of the procedure were explained to the patient.  Informed consent was obtained.     The patient was placed in the dorsal lithotomy position and a sterile speculum was placed in the vagina. The cervix was sterilized with Betadine x 3. The anterior lip of the cervix was grasped with a single-tooth tenaculum. The acorn cannula was then placed in the cervix. The patient was positioned and images were taken with fluoroscopy as dye was inserted through the cannula. All instruments were then removed. The patient tolerated the procedure well and was discharged home the same day without complications.    PRELIMINARY NURSE PRACTITIONER ASSESSMENT - FOR A FINAL REPORT, PLEASE REFER TO THE FINALIZED DOCUMENTATION IN THE IMAGING TAB   Uterus:  broad shaped uterus with slight distortion of right cornua  Tubes:  bilateral patency with free spill of dye, normal tubal architecture noted, and no loculations present.  Based on these findings, my recommendation is: Follow up required, chart forwarded to primary MD. Dr. Espinosa reviewed the images, at this time no further testing is needed at this time. If after 3 unsuccessful cycles of Clomid/IUI, may re-visit additional testing, pt verbalized understanding. Had a normal SIS in 5-2024, intramural fibroid does not impact cavity.    The patient was counseled regarding the above preliminary findings and understands these will be reviewed by the reading physician.     Mary Pruitt CNP 07/15/24 4:43 PM           Post-procedure details:     Procedure completion:  Tolerated well, no immediate complications

## 2024-07-26 ENCOUNTER — APPOINTMENT (OUTPATIENT)
Dept: ENDOCRINOLOGY | Facility: CLINIC | Age: 39
End: 2024-07-26
Payer: COMMERCIAL

## 2024-11-21 ENCOUNTER — TELEMEDICINE (OUTPATIENT)
Dept: ENDOCRINOLOGY | Facility: CLINIC | Age: 39
End: 2024-11-21
Payer: COMMERCIAL

## 2024-11-21 VITALS — BODY MASS INDEX: 22.2 KG/M2 | WEIGHT: 130 LBS | HEIGHT: 64 IN

## 2024-11-21 DIAGNOSIS — N97.9 FEMALE INFERTILITY: Primary | ICD-10-CM

## 2024-11-21 PROCEDURE — 3008F BODY MASS INDEX DOCD: CPT | Performed by: NURSE PRACTITIONER

## 2024-11-21 PROCEDURE — 99214 OFFICE O/P EST MOD 30 MIN: CPT | Performed by: NURSE PRACTITIONER

## 2024-11-21 PROCEDURE — 1036F TOBACCO NON-USER: CPT | Performed by: NURSE PRACTITIONER

## 2024-11-21 ASSESSMENT — PAIN SCALES - GENERAL: PAINLEVEL_OUTOF10: 0-NO PAIN

## 2024-11-21 NOTE — PROGRESS NOTES
Virtual or Telephone Consent: An interactive audio and video telecommunication system which permits real time communications between the patient (at the originating site) and provider (at the distant site) was utilized to provide this telehealth service  Most done over the phone due to patient having trouble with sound.    Follow Up Visit HPI    Patient is a 39 y.o.  female with Unexplained Infertility presenting today for follow up visit.     Testing to date:   Result Date Done   TSH: 1.32 (Ref range: 0.44 - 3.98 mIU/L) 2024   AMH: 4.479 (Ref range: 0.176 - 11.705 ng/mL) 2024   PRL: No results found for requested labs within last 365 days. No results found for requested labs within last 365 days.   Testosterone: No results found for requested labs within last 365 days. No results found for requested labs within last 365 days.   DHEAS: No results found for requested labs within last 365 days. No results found for requested labs within last 365 days.   Other:   Component      Latest Ref Rng 2024   ABO TYPE O    Rh Type NEG    ANTIBODY SCREEN POS    Rubella, IgG      Negative  Positive    Rubella, IgG Index      <=0.7 IA IA 2.3    Varicella Zoster, IgG      Negative  Positive !    Varicella Zoster, IgG Index      <=0.8 IA 1.8 (H)    Antibody ID Anti-C, Anti-D    Anti-Mullerian Hormone      0.176 - 11.705 ng/mL 4.479    Thyroid Stimulating Hormone      0.44 - 3.98 mIU/L 1.32    Hepatitis B Surface AG      Nonreactive  Nonreactive    Hepatitis C AB      Nonreactive  Nonreactive    HIV 1/2 Antigen/Antibody Screen with Reflex to Confirmation      Nonreactive  Nonreactive    Syphilis Total Ab      Nonreactive  Nonreactive       Legend:  ! Abnormal  (H) High    Declines Myraid      Hysterosalpingogram: FL HYSTEROSALPINGOGRAM (7/15/2024):   FINDINGS:  Smooth uterine cavity with no demonstrated changes in endometrial  contour or filling defects noted. The fallopian tubes fill with  contrast and free  peritoneal spill is noted bilaterally. There is a  small area of dilation in the isthmic to ampulla transition of the  right tube, however, no distal dilation is noted and both tubes are  patent without evidence of hydrosalpinx.      IMPRESSION:  1. Correlate with real time fluoroscopic findings at the time of  procedure.  2. Overall normal study with tubal patency confirmed bilaterally      Based on these findings, my recommendation is:  Follow up with ordering provider      MACRO:  None    Saline Infused Sonography: US SONOHYSTEROGRAM (5/30/2024):   Fertility Testing  Impression  =========     Intramural fibroid noted approximately 2 cm, does not appear to impact uterine cavity. Unremarkable uterine cavity upon instillation of saline  Follow-up  ========  GYN Pelvic Ultrasound: NA    Partner SA: Normal and STD's negative (Still needs GC/CT)  Component      Latest Ref Rng 5/3/2024   Andrology Lab ID# [id number]    Semen Appearance Normal    Semen Viscosity Abnormal    Semen Liquefaction Normal    Debris (Semen) Yes    Clumps (Semen) Yes    Agglutination (Semen) No    Volume (Semen)      >=1.5 mL 2.0    Concentration(Semen)      >=15 mill/mL 63.49    Total Motility (Semen)      >=40 % 59    Prog. Motility (Semen)      >=32 % 43    Non Prog. Motility (Semen)      % 15    Total No of Sperm (Semen)      >=39 mill 126.98    Total No of Motile (Semen)      mill 74.29    % Normal (Semen)      >=4 % 1.3 !    % Head defects (Semen)      % 98.3    % Neck Midpiece (Semen)      % 28.8    % Tail defects (Semen)      % 1.0    % Ex Residual Cytoplasm (Semen)      % 1.3    Tot. No of Norm. Sperm (Semen)      mill 1.587    Tot. No of Norm. Motile Sperm (Semen)      mill 0.929    Abstinence (days)      2 - 7 days 3    Collected Completely No    Portion Missed End    Amount Missed: Small Portion    Collection Location Center    Collection Method Masturbation    Received time 1:49 PM    Analyzed Time 2:09 PM       Legend:  !  "Abnormal  [place]  Treatment to date:   Fertility Cycles       Cycle Name Treatment Start Date Type Outcome    Cycle #1   Active            Past Medical History:   Diagnosis Date    Allergy status to unspecified drugs, medicaments and biological substances     History of seasonal allergies    Anxiety     Encounter for screening for infections with a predominantly sexual mode of transmission 2019    Screen for STD (sexually transmitted disease)    Headache     Migraines     Personal history of other infectious and parasitic diseases     History of infection due to human papilloma virus (HPV)    Personal history of other specified conditions 2021    History of insomnia    Personal history of urinary (tract) infections     History of bladder infections     Past Surgical History:   Procedure Laterality Date    ADENOIDECTOMY  2016    Adenoidectomy    APPENDECTOMY  2016    Appendectomy    DILATION AND CURETTAGE OF UTERUS      HEMORRHOID SURGERY  2016    Hemorrhoidectomy    OTHER SURGICAL HISTORY  2022    Breast biopsy     Current Outpatient Medications on File Prior to Visit   Medication Sig Dispense Refill    propranolol (Inderal) 20 mg tablet Take 1 tablet (20 mg) by mouth once daily. 90 tablet 3    dicyclomine (Bentyl) 20 mg tablet Take 1 tablet (20 mg) by mouth 4 times a day.      loratadine (CLARITIN ORAL) Take 1 tablet by mouth once daily as needed.      nortriptyline (Pamelor) 10 mg capsule Take 1 capsule (10 mg) by mouth once daily at bedtime. 90 capsule 3    ZOLMitriptan (Zomig) 5 mg tablet Take 1 tablet (5 mg) by mouth 1 time if needed.       No current facility-administered medications on file prior to visit.       BMI:   BMI Readings from Last 1 Encounters:   24 22.31 kg/m²     VITALS:  Ht 1.626 m (5' 4\")   Wt 59 kg (130 lb)   LMP 10/31/2024   BMI 22.31 kg/m²   LMP: Patient's last menstrual period was 10/31/2024.    ASSESSMENT   39 y.o.  female " with secondary infertility x 6.5 years, Unexplained Infertility and the following pertinent medical issues: migraines .    COUNSELING    Reviewed best chance for pregnancy is with IVF. Diagnosis of unexplained infertility. Given long duration and age would recommend IVF. Ok to try letrozole with TI or IUI until then. Patient in agreement.     We discussed that Letrozole is used for ovulation induction and that recent studies have found letrozole is superior to Clomid for ovulation induction in patients with PCOS. We discussed common side effects of Letrozole including headaches, vision changes, hot flashes, mood changes, and breast tenderness.  Letrozole is NOT FDA approved for the treatment of infertility and was initially associated with (in some studies) a higher risk of congenital anomalies, although this was not found in a more recent large study of patients taking Letrozole for unexplained infertility. Patients must take a pregnancy test prior to starting Letrozole each month. We discussed that there is an increased risk of multiple gestation with Letrozole approximately 10% for twins and less than 1% for triplets.  Counseled regarding option of selective reduction for higher order multiples.    Letrozole: 2 tablet(s) a day, cycle days 3-7   Note: 1st day of full flow is cycle day 1      Have sexual intercourse approximately every other day for 1 week beginning cycle day 11  You don't need temperature charts or LH predictor kits because normal cycle lengths indicate ovulatory cycles.  If you are planning inseminations, you will use LH predictor kits for timing    If normal 28 - 32 day cycle, repeat above for a total of 3 cycles    Call office if:   pregnant  cycles longer than 35 days   not pregnant after 3 regular cycles     Side effects of Letrozole may include:  acne  headache   hot flushes  leg cramps   nausea     If side effects are severe, alternative medications may be available. Letrozole has a 8-10%  chance of twins and less than 1 % chance of triplets.     Progesterone:  May be given to bring on a period if you have not had a period after 40 days and a home pregnancy test is negative. Continue to take Progesterone if you have light bleeding. Expect to bleed within 2 weeks of finishing Progesterone    Letrozole is used for ovulation inductions. Letrozole is not a FDA approved medication for infertility treatment and may be associated with an increased for of congenital defects, although this was not found in a recent large study of patient taking letrozole for unexplained infertility. Letrozole is teratogenic therefore a urine pregnancy test should be taken prior to taking the medication.     Routine Testing  Fertility Center  STDs Within 1 year   Genetic carrier Waiver/Completed   T&S Within 1 year   AMH Within 1 year   TSH Within 1 year   Rubella/Varicella Within 5 years     BMI Testing  Fertility Center  CBC Within 1 year   CMP Within 1 year   HgbA1c Within 1 year   Mag, Phos, Vit D <18 Within 1 year   MFM > 40  REQ   Wt loss consult > 40 OPT     PLAN  No orders of the defined types were placed in this encounter.      FOLLOW UP   Consults:  IVF consult  Engaged MD  Take prenatal vitamins, vitamin D 2000 IUs daily  Discussed that treatment cannot proceed until checklist items are complete.   Additional testing for BMI < 18 or > 40: No.  Chart to primary nurse for care coordination and patient check list/education.  Will send a message to Daniel to start the boarding pass.     MD Completion:  Ectopic Risk: No  Medically Complex: No  Outstanding boarding pass items: Waiting to hear back from MFM if anything needs to be done with anti c and D antibody. They wanted  tested and he is negative.    needs GC/CT done. -for IVF  Will need to sign [employer] waiver- for both    Fertility Plan Update: Letrozole 5mg with TI x 3 or until IVF.    Yennifer MIKE Tripp  11/21/2024  3:32 PM

## 2024-11-25 DIAGNOSIS — R76.8 RED BLOOD CELL ANTIBODY POSITIVE: Primary | ICD-10-CM

## 2024-11-25 NOTE — PROGRESS NOTES
Boarding Pass Oral TIC/IUI    Age: 39 y.o.    Provider: Yennifer Tripp CNP  Primary RN: Gracia Guerra  Reasons for Treatment: Unexplained Infertility  Last BMI  24 : 22.31 kg/m²       Past Medical History:   Diagnosis Date    Allergy status to unspecified drugs, medicaments and biological substances     History of seasonal allergies    Anxiety     Encounter for screening for infections with a predominantly sexual mode of transmission 2019    Screen for STD (sexually transmitted disease)    Headache     Migraines     Personal history of other infectious and parasitic diseases     History of infection due to human papilloma virus (HPV)    Personal history of other specified conditions 2021    History of insomnia    Personal history of urinary (tract) infections     History of bladder infections       Date Done Consultation Results/Comments   NA Medication Protocol   Fertility Plan Update:: Letrozole 5mg with TI x 3 or until IVF.   Trigger plan:  NA  Adjuncts:  NA  Notes: Waiting to hear back from MFM if anything needs to be done with anti c and D antibody.    NA Procedure Order Placed []     Date Done Female Labs Results/Comments   2024 T&S (Q 1 Year) ABO: O  Rh: NEG  Antibody: POS     2024 Hep B sAg Nonreactive   2024 Hep C AB Nonreactive   2024 HIV Nonreactive   2024 Syphilis Nonreactive   2024 GC/CT GC: Negative  CT: Negative   2024 Rubella (Q 5 Years) Positive   2024 Varicella (Q 5 Years) Positive (A)   NA Carrier Screening  Sent  [facility] 2bP: na  Declined  N/A   [facility] GYN Waiver []      Date Done Male Labs (required if IUI)   Results/Comments  ROZINA BURNS (MRN: [id number])   2024 Hep B sAg Nonreactive   2024 Hep C AB  Nonreactive   2024 HIV Nonreactive   2024 Syphilis Nonreactive   NA GC/CT GC: ***  CT: ***   NA Carrier Screening na  Declined  N/A   5/3/2024 Semen Analysis  Volume(mL): 2  Count(million):  63.49  Motility(%): 59  Motile Count(million): .929     MD Completion:  Ectopic Risk: No  Medically Complex: No

## 2024-11-25 NOTE — PROGRESS NOTES
MD Yennifer Vasquez APRN-CNP  Yes and they must have a titer before she is seen.          Previous Messages       ----- Message -----  From: LOUISE Alvarez  Sent: 11/21/2024   3:22 PM EST  To: Mary Delong MD    Patient has antibodies for Anti C and Anti D. Would you want to see her for preconception?    LOUISE Alvarez      Placed order for antibody titer. Will reach out to patient via "[employer] to get this drawn and schedule with MFM.    Yennifer Tripp 11/25/24 12:35 PM

## 2024-12-04 ENCOUNTER — TELEPHONE (OUTPATIENT)
Dept: ENDOCRINOLOGY | Facility: CLINIC | Age: 39
End: 2024-12-04
Payer: COMMERCIAL

## 2024-12-04 NOTE — TELEPHONE ENCOUNTER
Returned patient call. Patient asked if she can call me back and a return number. FD number given.  navdeep mathews 12/04/24 1:48 PM

## 2024-12-05 ENCOUNTER — LAB REQUISITION (OUTPATIENT)
Dept: LAB | Facility: HOSPITAL | Age: 39
End: 2024-12-05
Payer: COMMERCIAL

## 2024-12-05 ENCOUNTER — LAB (OUTPATIENT)
Dept: LAB | Facility: LAB | Age: 39
End: 2024-12-05
Payer: COMMERCIAL

## 2024-12-05 ENCOUNTER — DOCUMENTATION (OUTPATIENT)
Dept: ENDOCRINOLOGY | Facility: CLINIC | Age: 39
End: 2024-12-05

## 2024-12-05 DIAGNOSIS — R76.8 RED BLOOD CELL ANTIBODY POSITIVE: ICD-10-CM

## 2024-12-05 DIAGNOSIS — R76.8 OTHER SPECIFIED ABNORMAL IMMUNOLOGICAL FINDINGS IN SERUM: ICD-10-CM

## 2024-12-05 LAB
ABO GROUP (TYPE) IN BLOOD: NORMAL
ANTIBODY SCREEN: NORMAL
RH FACTOR (ANTIGEN D): NORMAL

## 2024-12-05 PROCEDURE — 86850 RBC ANTIBODY SCREEN: CPT

## 2024-12-05 PROCEDURE — 86870 RBC ANTIBODY IDENTIFICATION: CPT

## 2024-12-05 PROCEDURE — 86901 BLOOD TYPING SEROLOGIC RH(D): CPT

## 2024-12-05 PROCEDURE — 36415 COLL VENOUS BLD VENIPUNCTURE: CPT

## 2024-12-05 PROCEDURE — 86900 BLOOD TYPING SEROLOGIC ABO: CPT

## 2024-12-05 NOTE — PROGRESS NOTES
Called patient to discuss. Daniel asked me to reach out because she did nto want to meet with M due to antibodies since she has had them in the past. Left a detailed message since she did not answer. Important bc it can affect the  and pregnancy. She did have titers drawn today.  To please call back to touch base  Yenniefr Tripp 24 5:03 PM

## 2024-12-05 NOTE — PROGRESS NOTES
Spoke with patient about reporting her cycle. Patient wants to start letrozole with TIC this cycle but she still needs MFM consult and Titters drawn. Patient and  have signed Engaged MD.  Patient BP updated but still needs VBPV. Patient also reminded of additional labs for her . Patient verbalized understanding.  navdeep mathews 12/05/24 1:41 PM

## 2024-12-06 LAB
BB ANTIBODY IDENTIFICATION: NORMAL
CASE #: NORMAL
PATH REV-IMMUNOHEMATOLOGY-PR30: NORMAL

## 2024-12-11 ENCOUNTER — DOCUMENTATION (OUTPATIENT)
Dept: ENDOCRINOLOGY | Facility: CLINIC | Age: 39
End: 2024-12-11
Payer: COMMERCIAL

## 2024-12-11 NOTE — PROGRESS NOTES
MD Mary Fernandez MD; Yennifer Tripp, APRN-CNP  Phone Number: [phone]     Thanks Shamar llanos. Yennifer, she is going to have to wait to talk with them. Sorry! If she wants to consult elsewhere due to lack of access here she can try, but I doubt she will get in anywhere else faster. We can still complete the rest of her BP and get her ready to go as soon as she has talked with MFM.          Previous Messages    MFM  (Newest Message First)  View All Conversations on this Encounter   Rizwana Prince MD  You; Mary Delong MD15 hours ago (6:18 PM)       Thanks Shamar llanos. Yennifer, she is going to have to wait to talk with them. Sorry! If she wants to consult elsewhere due to lack of access here she can try, but I doubt she will get in anywhere else faster. We can still complete the rest of her BP and get her ready to go as soon as she has talked with MFM.      Mary Delong MD  You; Rizwana Prince MD21 hours ago (12:01 PM)       We have no record of an MFM consultation. Her last pregnancy was 16y ago and that baby was affected based on the notes, severe jaundice and required  transfusion.    She needs an MFM consult and antibody titers, some would even consider her a candidate for IVIG based on her deepika  course but this is controversial.    The schedule for a preconception visit is fully booked thru January based on what I have been told.    ESR      Rizwana Prince MD  You; Mary Delong MD22 hours ago (11:56 AM)       Mary so sorry to bother you, do you think anything has changed since she was last counseled that would make us not want to proceed with her treatment?      You  Rizwana Prince MD2 days ago       This patient is supposed to get in to see MFM due to having anti c and anti D antbodies. Any recommendations?     Lucretia Moreland LPN routed conversation to You5 days ago     Zoe Arreola [id number]  Staff (supporting You)5 days ago     Lemuel De Oliveira, I was trying to get the prescription for the meds that we discuss trying while waiting for the IVF. I did the blood work, but the apt with MFM can not be scheduled due to high patient volume, and she put me on the waiting list. I really don't have to see them, I had this conversation years ago, I had to deal with it with my second pregnancy. Please, give me a call so we can discuss the plan. Thank you.       You  Zoe Wong2 weeks ago       Zoe     I spoke to high risk OB/MFM. They do want to see you prior to getting pregnant due to the antibodies in your blood. They also wanted a titer done. You order is in for the titer. You can do this at any  lab. To schedule with MFM please call . You will want to schedule a preconception visit.     RAZ Alvarez-CNP        Noted will reply to her [employer] message.  Yennifer Tripp 12/11/24 9:59 AM

## 2025-01-06 ENCOUNTER — PATIENT MESSAGE (OUTPATIENT)
Dept: ENDOCRINOLOGY | Facility: CLINIC | Age: 40
End: 2025-01-06
Payer: COMMERCIAL

## 2025-01-07 ENCOUNTER — CONSULT (OUTPATIENT)
Dept: ENDOCRINOLOGY | Facility: CLINIC | Age: 40
End: 2025-01-07
Payer: COMMERCIAL

## 2025-01-07 VITALS
WEIGHT: 136.38 LBS | HEIGHT: 65 IN | BODY MASS INDEX: 22.72 KG/M2 | HEART RATE: 68 BPM | SYSTOLIC BLOOD PRESSURE: 93 MMHG | DIASTOLIC BLOOD PRESSURE: 62 MMHG

## 2025-01-07 DIAGNOSIS — Z31.41 FERTILITY TESTING: Primary | ICD-10-CM

## 2025-01-07 PROCEDURE — 99215 OFFICE O/P EST HI 40 MIN: CPT | Performed by: OBSTETRICS & GYNECOLOGY

## 2025-01-07 PROCEDURE — 99417 PROLNG OP E/M EACH 15 MIN: CPT | Performed by: OBSTETRICS & GYNECOLOGY

## 2025-01-07 ASSESSMENT — COLUMBIA-SUICIDE SEVERITY RATING SCALE - C-SSRS
6. HAVE YOU EVER DONE ANYTHING, STARTED TO DO ANYTHING, OR PREPARED TO DO ANYTHING TO END YOUR LIFE?: NO
1. IN THE PAST MONTH, HAVE YOU WISHED YOU WERE DEAD OR WISHED YOU COULD GO TO SLEEP AND NOT WAKE UP?: NO
2. HAVE YOU ACTUALLY HAD ANY THOUGHTS OF KILLING YOURSELF?: NO

## 2025-01-07 ASSESSMENT — PATIENT HEALTH QUESTIONNAIRE - PHQ9
2. FEELING DOWN, DEPRESSED OR HOPELESS: NOT AT ALL
SUM OF ALL RESPONSES TO PHQ9 QUESTIONS 1 AND 2: 0
1. LITTLE INTEREST OR PLEASURE IN DOING THINGS: NOT AT ALL

## 2025-01-07 ASSESSMENT — LIFESTYLE VARIABLES
CURRENT_SMOKER: NO
CURRENT_SMOKER: NO

## 2025-01-07 ASSESSMENT — PAIN SCALES - GENERAL: PAINLEVEL_OUTOF10: 0-NO PAIN

## 2025-01-07 NOTE — PROGRESS NOTES
Visit Type: In Person    IVF Note     Zoe is a 39 y.o.  female, here for IVF consult due to Unexplained Infertility.  The patient has a history of anti-C and anti-D antibody and previous child required transfusion after birth.  She and her partner are awaiting maternal-fetal medicine consultation.  Here today with: Partner  LMP: Patient's last menstrual period was 12/28/2024.  Menstrual cycles: Regular  AMH: 4.479 (Ref range: 0.176 - 11.705 ng/mL)  Result Date Done   TSH: 1.32 (Ref range: 0.44 - 3.98 mIU/L) 4/23/2024   AMH: 4.479 (Ref range: 0.176 - 11.705 ng/mL) 4/23/2024   PRL: No results found for requested labs within last 365 days. No results found for requested labs within last 365 days.   Testosterone: No results found for requested labs within last 365 days. No results found for requested labs within last 365 days.   DHEAS: No results found for requested labs within last 365 days. No results found for requested labs within last 365 days.   Other:   Component      Latest Ref Rng 4/23/2024   ABO TYPE O    Rh Type NEG    ANTIBODY SCREEN POS    Rubella, IgG      Negative  Positive    Rubella, IgG Index      <=0.7 IA IA 2.3    Varicella Zoster, IgG      Negative  Positive !    Varicella Zoster, IgG Index      <=0.8 IA 1.8 (H)    Antibody ID Anti-C, Anti-D    Anti-Mullerian Hormone      0.176 - 11.705 ng/mL 4.479    Thyroid Stimulating Hormone      0.44 - 3.98 mIU/L 1.32    Hepatitis B Surface AG      Nonreactive  Nonreactive    Hepatitis C AB      Nonreactive  Nonreactive    HIV 1/2 Antigen/Antibody Screen with Reflex to Confirmation      Nonreactive  Nonreactive    Syphilis Total Ab      Nonreactive  Nonreactive       Legend:  ! Abnormal  (H) High     Declines Myraid        Hysterosalpingogram: FL HYSTEROSALPINGOGRAM (7/15/2024):   FINDINGS:  Smooth uterine cavity with no demonstrated changes in endometrial  contour or filling defects noted. The fallopian tubes fill with  contrast and free peritoneal spill  is noted bilaterally. There is a  small area of dilation in the isthmic to ampulla transition of the  right tube, however, no distal dilation is noted and both tubes are  patent without evidence of hydrosalpinx.      IMPRESSION:  1. Correlate with real time fluoroscopic findings at the time of  procedure.  2. Overall normal study with tubal patency confirmed bilaterally      Based on these findings, my recommendation is:  Follow up with ordering provider      MACRO:  None     Saline Infused Sonography: US SONOHYSTEROGRAM (5/30/2024):   Fertility Testing  Impression  =========     Intramural fibroid noted approximately 2 cm, does not appear to impact uterine cavity. Unremarkable uterine cavity upon instillation of saline  Follow-up  ========  GYN Pelvic Ultrasound: NA     Partner - Milton Conway  Partner SA: Normal and STD's negative (Still needs GC/CT)  Component      Latest Ref Rng 5/3/2024   Andrology Lab ID# [id number]    Semen Appearance Normal    Semen Viscosity Abnormal    Semen Liquefaction Normal    Debris (Semen) Yes    Clumps (Semen) Yes    Agglutination (Semen) No    Volume (Semen)      >=1.5 mL 2.0    Concentration(Semen)      >=15 mill/mL 63.49    Total Motility (Semen)      >=40 % 59    Prog. Motility (Semen)      >=32 % 43    Non Prog. Motility (Semen)      % 15    Total No of Sperm (Semen)      >=39 mill 126.98    Total No of Motile (Semen)      mill 74.29    % Normal (Semen)      >=4 % 1.3 !    % Head defects (Semen)      % 98.3    % Neck Midpiece (Semen)      % 28.8    % Tail defects (Semen)      % 1.0    % Ex Residual Cytoplasm (Semen)      % 1.3    Tot. No of Norm. Sperm (Semen)      mill 1.587    Tot. No of Norm. Motile Sperm (Semen)      mill 0.929    Abstinence (days)      2 - 7 days 3    Collected Completely No    Portion Missed End    Amount Missed: Small Portion    Collection Location Center    Collection Method Masturbation    Received time 1:49 PM    Analyzed Time 2:09 PM          Fertility Cycles       Cycle Name Treatment Start Date Type Outcome    Cycle #1   Active            Relationship Status:       OB Hx     OB History            2    Para   1    Term   0       0    AB   1    Living   0           SAB   1    IAB   0    Ectopic   0    Multiple   0    Live Births   1             Obstetric Comments    son who is 15 years old, , gdm, induced at 39 weeks, jaundice- NICU 19 days for severe jaundice and had blood transfusions,      1 SAB prior - D&C               GYN HISTORY   HX of abnormal Mammo: Yes, patient with BI-RADS 4 mammogram in , status post breast biopsy with Dr. Gricel Jack in May 2021 showed benign findings.  Recommendations are to follow-up with mammogram screening starting at age 40  Pap smear: No results found for requested labs within last 365 days.  HPV: Negative       Have you ever been diagnosed with a sexually transmitted disease? Have you ever been diagnosed with a sexually transmitted disease?: No     Please select all that are applicable:   N/A  Have you ever had Pelvic Inflammatory Disease? Have you ever had Pelvic Inflammatory Disease?: No     Have you had an abnormal PAP smear? Have you had an abnormal PAP smear?: No     Date & Result of last PAP smear:          Lab Results   Component Value Date     FINALINTERP   2024             A. THINPREP PAP CERVIX, SCREENING -      Specimen Adequacy  Satisfactory for evaluation; endocervical/transformation zone component is present     General Categorization  Negative for intraepithelial lesion or malignancy.     Descriptive Interpretation  Negative for intraepithelial lesion or malignancy                  Have you ever had an abnormal Mammogram? Have you ever had an abnormal mammogram?: Yes     Date & result of your last mammogram: Date of last mammogram::   Patient told to start at age 40 unless anything is palpated.     Do you have pelvic pain? Do you have pelvic pain?:  No     How many times per week do you have intercourse? If applicable, how many times a week are you having intercourse, trying to get pregnant during your fertile window?: 3     Do you have pain with intercourse? Do you have pain with intercourse?: No     Do you use lubricants with intercourse?    Do you have pain with bowel movements? Do you have pain with bowel movements?: No              Do you have pain with a full bladder? Do you have pain with a full bladder?: No     MENSTRUAL HISTORY  LMP: When was your last menstrual period?: Other  4/15/24     Menarche: If applicable, when was your first occurrence of menstruation?: 15 yo     Contraception: What (if any) type of birth control do you currently use?: None     Cycle length: What is the average number of days between menstrual cycles?: 28     Describe your bleeding: Describe your bleeding:: Average  Last 3 months have had some spotting around ovulation.     Dysmenorrhea: Are your menstrual periods painful?: Yes  Takes motrin q 4 hrs. Pretty significant.      ENDOCRINE/INFERTILITY HISTORY  Duration of infertility: If applicable, what is the approximate date you began trying to get pregnant?: More than 5 years     Coital Activity/week: If applicable, how many times a week are you having intercourse, trying to get pregnant during your fertile window?: 3     Nipple Discharge: Do you experience any loss of milk or liquid discharge from the breasts?: No     Vision changes: Are you experiencing any vision changes?: No     Headaches: Are you experiencing headaches?: Yes     Excess hair growth: Are you experiencing persistent or worsening hair growth on the face, breasts or lower abdomen?: No     Excessive hair loss: Are you experiencing loss of hair from your scalp?: Yes  3-4 years     Acne: Are you experiencing acne?: No     Oily skin: Oily skin?: Yes     Recent weight change  Weight gain: Are you experiencing any increase in weight?: No     Weight loss: Are you  experiencing any decrease in weight?: No     Exercise more than 3 times a week: Exercise more than 3 times a week?: Yes        PMH  Medical History        Past Medical History:   Diagnosis Date    Allergy status to unspecified drugs, medicaments and biological substances       History of seasonal allergies    Anxiety      Encounter for screening for infections with a predominantly sexual mode of transmission 12/09/2019     Screen for STD (sexually transmitted disease)    Headache      Migraines      Personal history of other infectious and parasitic diseases       History of infection due to human papilloma virus (HPV)    Personal history of other specified conditions 11/11/2021     History of insomnia    Personal history of urinary (tract) infections       History of bladder infections            MEDICATIONS  Medications Ordered Prior to Encounter          Current Outpatient Medications on File Prior to Visit   Medication Sig Dispense Refill    dicyclomine (Bentyl) 20 mg tablet Take 1 tablet (20 mg) by mouth 4 times a day.        loratadine (CLARITIN ORAL) Take 1 tablet by mouth once daily as needed.        propranolol (Inderal) 20 mg tablet TAKE 1 TABLET BY MOUTH TWICE A  tablet 1    SUMAtriptan (Imitrex) 100 mg tablet TAKE 1 TABLET AT ONSET OF MIGRAINE HEADACHE.  MAY REPEAT IN 2 HOURS IF NEEDED.        traZODone (Desyrel) 50 mg tablet TAKE 1/2-2 TABLETS AT BEDTIME AS NEEDED FOR SLEEP.        ZOLMitriptan (Zomig) 5 mg tablet Take 1 tablet (5 mg) by mouth 1 time if needed.        [DISCONTINUED] montelukast (Singulair) 10 mg tablet TAKE 1 TABLET BY MOUTH EVERY DAY          No current facility-administered medications on file prior to visit.            PSH  Surgical History         Past Surgical History:   Procedure Laterality Date    ADENOIDECTOMY   11/09/2016     Adenoidectomy    APPENDECTOMY   11/09/2016     Appendectomy    DILATION AND CURETTAGE OF UTERUS        HEMORRHOID SURGERY   11/09/2016      Hemorrhoidectomy    OTHER SURGICAL HISTORY   2022     Breast biopsy            PSYCH HISTORY  Have you ever been diagnosed with a mental health Issue?: No     Have you ever been hospitalized for a mental health disorder?: No        SOCIAL HISTORY  Social History   Social History            Tobacco Use    Smoking status: Never    Smokeless tobacco: Never   Substance Use Topics    Alcohol use: Yes       Alcohol/week: 2.0 standard drinks of alcohol       Types: 2 Glasses of wine per week       Comment: social    Drug use: Never         Occupation: Your occupation:: RN     Have you ever been incarcerated? Have you ever been incarcerated?: No     Do you have a history of domestic violence? Do you have a history of domestic violence?: No     Do you feel safe at home? Do you feel safe at home?: Yes     Do you have a history of any negative sexual experience such as incest or rape? Do you have a history of any negative sexual experience such as incest or rape?: No        PARTNER HISTORY  Partner Name: Partner name:: Milton Conway     : Partner :: 85     Occupation: Partner occupation:: Track      Prior fertility history: Partner Prior Fertility History:: No childer     PMH: Partner Past Medical History:: None     PSH: Partner Past Surgical History:: Unsure     Smoking:Partner: Recent or current tobacco use: No     Alcohol Use: Partner: Recent or current alcohol use: No     Drug Use: Partner: Recent or current drug use: No     Medications:    Injuries: Partner: Any history of surgeries or injuries in the reproductive area?: Yes     STD: Have you ever been diagnosed with a sexually transmitted disease?: No     Please select all that are applicable:    SA: Prior Semen Analysis completed?: No     SA Results:   N/A     FAMILY HISTORY  Family History          Family History   Problem Relation Name Age of Onset    Hypertension Mother Praveena      Hyperthyroidism Mother Praveena      Diabetes Father Ion    "   Hypertension Father Ion      Breast cancer Maternal Grandmother                CANCER HISTORY    Breast: Breast Cancer: Please answer Yes, No or Unsure. If Yes, please, identify which family member.: Grandmother  Maternal     Ovarian: Ovarian Cancer: Please answer Yes, No or Unsure. If Yes, please, identify which family member.: No     Colon: Colon Cancer: Please answer Yes, No or Unsure. If Yes, please, identify which family member.: No  Pancreatic cancer on paternal side age 84     Endometrial: Endometrial Cancer: Please answer Yes, No or Unsure. If Yes, please, identify which family member.: No        FAMILY VTE HISTORY  Family History of Blood Clots: Blood Clots: Please answer Yes, No or Unsure. If Yes, please, identify which family member.: No        GENETIC HISTORY  Ethnic Background  Patient:   white  Partner:  white  Genetic Disease in Family  Patient: Patient: Defects and genetic syndromes? Please answer Yes, No or Unsure: No     Partner: Partner: Defects and genetic syndromes? Please answer Yes, No or Unsure: No     Birth Defects in Family  Patient: Patient: Birth defects? Please answer Yes, No or Unsure: No     Partner: Partner: Birth defects? Please answer Yes, No or Unsure: No     Genetic screening performed previously:  none  VITALS:  BP 93/62   Pulse 68   Ht 1.64 m (5' 4.57\")   Wt 61.9 kg (136 lb 6 oz)   LMP 2024   BMI 23.00 kg/m²   BMI:   BMI Readings from Last 1 Encounters:   25 23.00 kg/m²     GEN: alert and oriented, cooperative, no distress, appears stated age  Psych:  Exhibits appropriate mood and judgement.  HEENT: NC/AT  Resp: Normal respiratory effort, no use of accessory muscles  Ext: No clubbing, cyanosis, or edema    ASSESSMENT   Zoe is a 39 y.o.  female with  unexplained infertility who is considering IVF.  She has anti-C and anti-D antibodies in her blood, and is awaiting her maternal-fetal medicine consultation.  She and her partner are considering IVF " versus OI/TI versus IUI.  Indication for IVF: Unexplained Infertility  Partner SA:  Isolated low morphology    We reviewed IVF and discussed the following:   In-vitro fertilization and embryo transfer  Stimulation protocols   Oocyte retrieval, risks    Cryopreservation   Assessment of fertilization   Embryo development  Statistics  ICSI/Assisted hatching   Embryo transfer and preparation    Risks of OHSS and multiple gestation   Cancelled cycles   Use of birth control   Selective reduction   Number of embryos to transfer   Ectopic pregnancy  and miscarriage  Team based care  Informed consent procedures  Folic acid supplementation   Genetic carrier screening   PGT  Frozen tissue storage and transport process  Discussed that pap and mammogram must be updated per ACOG guidelines before treatment can begin    ART Cycle Plan -if patient elects to proceed with IVF    1. Protocol/Fertility Plan Update:  Lead in: OCP  Stimulation protocol: Antagonist  - dosing of gonadotropins will be dependent upon repeat AMH testing  Trigger plan: HCG vs Lupron  Pre-retrieval meds: Antibiotics per protocol  Adjuncts:  none  Notes:     2. FET:  Protocol: Programmed  Adjuncts:  none  OCP candidate: Yes  Notes:     3. Insemination:  Sperm source: partner  Sperm collection method: Fresh with Frozen Backup  Notes:  ICSI: Yes  # of oocytes to be fertilized: all    4. Transfer:   Number of embryos to replace: 1  Stage of embryo transfer: day 5  Trial transfer needed? No    5. Cryopreservation plan  PGT: Will consider.  Patient and partner will also consider whether or not they would like to proceed with myriad testing.  Freeze all? Yes  Oocyte cryopreservation: No    6. Patient willing to accept blood transfusion: Yes    7. RN to review chart, initiate IVF boarding pass, and assure completion of the following prior to proceeding with IVF stimulation:       No orders of the defined types were placed in this encounter.      STDs (Hepatitis B,  Hepatitis C, HIV, Syphilis, GC/CT) for patient and partner (if applicable) to be completed within the last year (z11.3)  Genetic carrier testing: waiver or carrier screen completed with clearance documentation by provider for both patient and partner (z13.71)  Rubella and varicella to be completed within the last five years (z11.59)   TSH to be completed within the last year (z13.29)  Type & Screen to be completed within the last year (z01.83)  AMH to be completed within the last year (z31.41)  Pre-IVF Imaging: Reference any orders placed by provider.  Cavity evaluation: hysteroscopy if they elect to proceed with IVF  Frozen sperm sample: ensure frozen partner sample (z31.41) or verify donor sperm on site prior to stimulation start date.  Verify in EMR or obtain copy of patient’s last mammogram (if applicable) and pap smear results for provider review in boarding pass.  Enroll in Engaged [employer] and complete annual consent forms for IVF and cryotransport agreements.  BMI checklist for BMI <18 or >40  Consults: Nursing and Financial Consult.  PAT Consult: No  Ectopic Risk: No  Medically Complex: No  Additional consults MFM consult (still needs to complete) and review what is in the boarding pass.    Lucien Hollingsworth  01/07/2025  9:43 AM

## 2025-01-16 DIAGNOSIS — Z31.41 FERTILITY TESTING: Primary | ICD-10-CM

## 2025-01-16 DIAGNOSIS — Z01.812 ENCOUNTER FOR PREPROCEDURAL LABORATORY EXAMINATION: ICD-10-CM

## 2025-01-16 DIAGNOSIS — N97.9 FEMALE INFERTILITY: ICD-10-CM

## 2025-01-16 DIAGNOSIS — Z31.83 ENCOUNTER FOR ASSISTED REPRODUCTIVE FERTILITY CYCLE: ICD-10-CM

## 2025-01-16 NOTE — PROGRESS NOTES
Boarding Pass IVF    Age: 39 y.o.    Provider: Lucien Hollingsworth MD  Primary RN: Blessing Lott  Reasons for Treatment: Unexplained Infertility  Last BMI  25 : 23.31 kg/m²       Past Medical History:   Diagnosis Date    Anxiety     HPV (human papilloma virus) infection     History of infection due to human papilloma virus (HPV)    Insomnia 2021    History of insomnia    Migraines     Personal history of urinary (tract) infections     History of bladder infections    Seasonal allergies     History of seasonal allergies       Date Done Consultation Results/Comments   2025 Medication Protocol Lead in: OCP  Fertility Plan Update:  Antagonist  menopur 150  Trigger plan: HCG vs Lupron  Pre-retrieval meds: Antibiotics per protocol  Adjuncts: none    FET:  Protocol: Programmed  Adjuncts:  none  OCP candidate: Yes   25 Authorization to Share [x]       GYN Waiver []   N/a IVF   2025 IVF Consult  [x]    PGT-A/M? Yes; Req Sent: No Miriam to send; PGT-M Test Ready: No  ; Company: [employer]   25 IVF Information and Authorization (to be completed annually) Received and in chart: Yes (Nydia Manriquez RN)     25  Waiver (Out) Form Received and in chart: Yes (Nydia Manriquez RN)   25 [employer] Packet [x]    2025 Procedure Order Placed [x]       25 MFM Consult In summary the following is recommended during pregnancy:     Monthly Ab titers until 24 weeks  At 24 weeks Ab titers should be performed every 2 weeks  If titer is 1:16 or higher then serial MCA doppler interrogation is recommended starting at 16 weeks.  If titer of 1:16 or higher is identified consultation with MFM is recommended.    I see no overt contraindication to pregnancy at this time.    Patient saw amol Solaresay to proceed    Psych Consult Okay to proceed? N/A    Genetics Consult Okay to proceed? N/A    Other    Date Done Female Labs Results/Comments   3/25/2025 T&S (Q 1 Year) ABO: O  Rh:  NEG  Antibody: POS     5/6/2025 Hep B sAg NON-REACTIVE   5/6/2025 Hep C AB NON-REACTIVE   5/6/2025 HIV NON-REACTIVE   5/6/2025 Syphilis Negative   5/6/2025 GC/CT GC: NOT DETECTED  CT: NOT DETECTED   4/23/2024 Rubella (Q 5 Years) Positive   4/23/2024 Varicella (Q 5 Years) Positive (A)   5/6/2025 TSH 2.03 (Ref range: mIU/L)    HgbA1C Not ordered   3/25/2025 AMH 1.17   12/05/2024 Carrier Screening Myriad 2bP:  Declined and Authorization completed  N/A    Uterine Cavity Eval Pelvic US: 4/10/2024  IMPRESSION:  Questionable 8 mm hyperechoic nodule involving the posterior  endometrial near the level of the fundus. There is nonspecific but  may relate to a polyp, submucosal fibroid, or endometrial  hyperplasia. Follow-up is recommended for re-evaluation. Fibroid  uterus. Small amount of fluid in the pelvis which is most likely  Physiologic.    HSG: FL HYSTEROSALPINGOGRAM (7/15/2024):   FINDINGS:  Smooth uterine cavity with no demonstrated changes in endometrial  contour or filling defects noted. The fallopian tubes fill with  contrast and free peritoneal spill is noted bilaterally. There is a  small area of dilation in the isthmic to ampulla transition of the  right tube, however, no distal dilation is noted and both tubes are  patent without evidence of hydrosalpinx.    SIS:  Intramural fibroid noted approximately 2 cm, does not appear to impact uterine cavity. Unremarkable uterine cavity upon instillation of saline     Hyster: (5/22/25) ]  Findings:   Cavity: smooth cavity, small polyp by the right cornu  Ostia: Bilateral tubal ostia visualized  Additional Notes:  Patient would prefer to undergo oocyte retrieval first, have the polyp removed prior to the transfer.     Hysteroscopic polypectomy under anesthesia orders placed.  Plan discussed with Dr. Prince   3/25/24 Pap Smear   3/25/2024    A. THINPREP PAP CERVIX, SCREENING -     Specimen Adequacy  Satisfactory for evaluation; endocervical/transformation zone component is    present    General Categorization  Negative for intraepithelial lesion or malignancy.    Descriptive Interpretation  Negative for intraepithelial lesion or malignancy         5/13/2021 Mammogram ( > 40)  BI-RADS 4 mammogram in 2021, status post breast biopsy with Dr. Gricel Jack in May 2021 showed benign findings.  Recommendations are to follow-up with mammogram screening starting at age 40    Date Done Male Labs   Results/Comments  ROZINA BURNS (MRN: [id number])   6/20/2024 Hep B sAg Nonreactive   6/20/2024 Hep C AB  Nonreactive   6/20/2024 HIV Nonreactive   6/20/2024 Syphilis Nonreactive   5/1/2025 GC/CT GC: NOT DETECTED  CT: NOT DETECTED   12/05/2024 Carrier Screening   Declined and Authorization completed  N/A   6/9/2025 Semen Analysis  Volume(mL): 2.4  Concentration(million/mL): 132.58  Motility(%): 34  Motile Count(million): 2.438   6/9/2025 Sperm Freeze  # of vials: 2  TMS post thaw: 16.44   Date Done Miscellaneous Results/Comments    BMI Checklist  BMI > 40 or < 18 Added to chart:   No    >= 45 Checklist  Added to chart:   No   **Does not need to be completed prior to placing on IVF calendar**    MD Completion:  PAT needed: No  Ectopic Risk: No  Medically Complex: No    Needs hysteroscopic polypectomy prior ot transfer. Also will need mammogram prior to transfer.   Ok to proceed with IVF stim.   Rizwana Prince 06/11/25 3:37 PM

## 2025-01-22 ENCOUNTER — DOCUMENTATION (OUTPATIENT)
Dept: ENDOCRINOLOGY | Facility: CLINIC | Age: 40
End: 2025-01-22
Payer: COMMERCIAL

## 2025-01-22 ENCOUNTER — PATIENT MESSAGE (OUTPATIENT)
Dept: ENDOCRINOLOGY | Facility: CLINIC | Age: 40
End: 2025-01-22
Payer: COMMERCIAL

## 2025-01-22 NOTE — PROGRESS NOTES
[employer] message sent to patient regarding next steps for IVF. BP started and most updated items are listed. Informed patient of remaining items that will need completed-Consent forms (sent to patient on Eng[employer]), schedule hysteroscopy, schedule MFM consult, partner needing gc/ct testing and to schedule sperm freeze. Patient still deciding on PGT testing-will need to confirm with her if she would like to proceed with this. Requested timeline of when patient would like to get process started. All orders other than medication have been pended for signing. LILLY cycle tab started. Will await to hear back from patient. Will need to add patient to start calendar, send tentative calendar, nurse teaching, sperm prep order, and medications once timeline is confirmed. Med doses not in consult note stating patient will need AMH-last AMH was done 4/2024. Will confirm with Dr. Hollingsworth if patient needs to have this rechecked or if doses can be added to protocol.   Nydia Manriquez 01/22/25 9:25 AM     Per Dr. Hollingsworth, patient will need repeat AMH level drawn to determine dosing for stim.  Order pended and message sent to patient to have this done and notify us when completed  Nydia Manriquez 01/22/25 4:25 PM

## 2025-02-25 PROCEDURE — 86901 BLOOD TYPING SEROLOGIC RH(D): CPT

## 2025-03-13 PROBLEM — U07.1 COVID-19: Status: RESOLVED | Noted: 2023-03-02 | Resolved: 2025-03-13

## 2025-03-13 PROBLEM — N93.9 ABNORMAL UTERINE BLEEDING: Status: RESOLVED | Noted: 2023-03-02 | Resolved: 2025-03-13

## 2025-03-13 PROBLEM — R79.89 ABNORMAL CBC: Status: RESOLVED | Noted: 2023-03-02 | Resolved: 2025-03-13

## 2025-03-13 PROBLEM — R92.8 ABNORMAL FINDING ON BREAST IMAGING: Status: RESOLVED | Noted: 2023-03-02 | Resolved: 2025-03-13

## 2025-03-13 ASSESSMENT — ENCOUNTER SYMPTOMS
POLYPHAGIA: 0
BRUISES/BLEEDS EASILY: 0
EYE REDNESS: 0
PALPITATIONS: 0
BLOOD IN STOOL: 0
DYSURIA: 0
DIZZINESS: 0
WOUND: 0
APPETITE CHANGE: 0
HALLUCINATIONS: 0
HEADACHES: 0
FREQUENCY: 0
FEVER: 0
VOMITING: 0
UNEXPECTED WEIGHT CHANGE: 0
SHORTNESS OF BREATH: 0
BACK PAIN: 0
ABDOMINAL PAIN: 0
HEMATURIA: 0
COUGH: 0
CONFUSION: 0
NECK PAIN: 1
EYE PAIN: 0
SORE THROAT: 0
ADENOPATHY: 0
TROUBLE SWALLOWING: 0
FATIGUE: 0
POLYDIPSIA: 0
EYE DISCHARGE: 0
CHILLS: 0

## 2025-03-13 NOTE — PROGRESS NOTES
Subjective   Patient ID: Zoe Wong is a 39 y.o. female who presents for Annual Exam.      Is pt fasting?  yes  Does pt see any providers other than care team below:   dave Tripp garratt, li, kimberlyn    Any forms to fill out? no  Last mammo? 2-3 years ago   Did dr chow say next mammo was due at age 40? After 40   When did neck pain start? 1 year ago   Where on the neck does it hurt? Lower neck into middle of back   Any fall or injury? no  Any other questions or concerns that pt wants to discuss today?   Tsh lab work request       No care team member to display    HPI  Last labs-4/2024 tsh nl  3/2023 cbc and cmp nl  7/2022 lipid nl  Due for labs- all    Cholesterol   Date Value Ref Range Status   07/05/2022 149 0 - 199 mg/dL Final     Comment:     .      AGE      DESIRABLE   BORDERLINE HIGH   HIGH     0-19 Y     0 - 169       170 - 199     >/= 200    20-24 Y     0 - 189       190 - 224     >/= 225         >24 Y     0 - 199       200 - 239     >/= 240   **All ranges are based on fasting samples. Specific   therapeutic targets will vary based on patient-specific   cardiac risk.  .   Pediatric guidelines reference:Pediatrics 2011, 128(S5).   Adult guidelines reference: NCEP ATPIII Guidelines,     LISA 2001, 258:2486-97  .   Venipuncture immediately after or during the    administration of Metamizole may lead to falsely   low results. Testing should be performed immediately   prior to Metamizole dosing.       Triglycerides   Date Value Ref Range Status   07/05/2022 83 0 - 149 mg/dL Final     Comment:     .      AGE      DESIRABLE   BORDERLINE HIGH   HIGH     VERY HIGH   0 D-90 D    19 - 174         ----         ----        ----  91 D- 9 Y     0 -  74        75 -  99     >/= 100      ----    10-19 Y     0 -  89        90 - 129     >/= 130      ----    20-24 Y     0 - 114       115 - 149     >/= 150      ----         >24 Y     0 - 149       150 - 199    200- 499    >/= 500  .   Venipuncture immediately after or during  "the    administration of Metamizole may lead to falsely   low results. Testing should be performed immediately   prior to Metamizole dosing.       HDL   Date Value Ref Range Status   07/05/2022 60.8 mg/dL Final     Comment:     .      AGE      VERY LOW   LOW     NORMAL    HIGH       0-19 Y       < 35   < 40     40-45     ----    20-24 Y       ----   < 40       >45     ----      >24 Y       ----   < 40     40-60      >60  .       No results found for: \"LDL\"  Thyroid Stimulating Hormone   Date Value Ref Range Status   04/23/2024 1.32 0.44 - 3.98 mIU/L Final     No results found for: \"A1C\"  No components found for: \"POCA1C\"  No results found for: \"ALBUR\"  No components found for: \"POCALBUR\"      Other concerns: in fertility treatment    Post nasal drip  Claritin and sinus and flonase    Seeing neuro for migraines    Lower neck pain into middle of back on off x 1yr  Switched job 8mon ago  Sit in front of computer  No fall or injury  Worse when sit and plank  Helps to move it  Burning pain and tightens  no new work activities or exercise routine  pain right now 0/10  pain at worst over 2wks 3-4/10  no redness, rash, warmth, bruising or swelling  no numbness, tingling, or weakness arms and hands  no pain down the arms  no wt loss, fever, or chills  no cp, heart racing, sob, wheezing, HA, dizziness, or vision change  Still pvcs on off; echo nl  h/o neck surgery  selftxt: none      bps at home- none    ER/urgicare visits in the last year- none  Hospitalizations in the last year- none      last Pap- 3/25/24; due 3/2029  H/o abn pap-remote-bx neg    FH ovarian, cervical, uterine ca-none    Current birth control method-in fertility treatments  No change in contraception desired    5/2021 diag mammo; got lf br bx-neg; due for next mammo age 40  FH br ca-mat gm    FH colon ca-none    Pgm pancr ca      Exercise- wt stretches and plants  Diet-2 meals and snacks  Body mass index is 22.47 kg/m².    last dental appt- 2-3 wks " go    BMs-regular  Sleep-able to fall asleep and stay asleep; no snoring or apnea  no cp, swelling, sob, abd pain, n/v/d/c, blood in stool or black stools  STI testing including hiv (age 15-65) and hep c screening (18-79)-declines        Immunization History   Administered Date(s) Administered    Hep B, Unspecified 07/05/2022, 09/12/2022, 01/16/2023    Influenza, seasonal, injectable 09/12/2022    MMR vaccine, subcutaneous (MMR II) 07/05/2022, 09/12/2022    Moderna SARS-CoV-2 Vaccination 05/10/2021, 06/07/2021    Tdap vaccine, age 7 year and older (BOOSTRIX, ADACEL) 01/01/2014, 09/15/2015       Tdap-holding to see if she gets pregnant      fractures in lifetime-none  Anyone with osteoporosis in the family-none    FH heart attack, heart surgery-none  FH [id number]    The ASCVD Risk score (Cesar GARCÍA, et al., 2019) failed to calculate for the following reasons:    The 2019 ASCVD risk score is only valid for ages 40 to 79  Coronary Artery Calcium score:  This test is recommended for men 45 or older and women 55 or older without a history of heart disease and have 1 risk factor (high LDL cholesterol, low HDL cholesterol, high blood pressure, smoker (current or past), type 2 diabetes, IBD, lupus, RA, ankylosing spondylitis, psoriasis or family history of  heart disease <55yrs in dad, brother or child or <65yrs in mom, sister, or child.)       Review of Systems   Constitutional:  Negative for appetite change, chills, fatigue, fever and unexpected weight change.   HENT:  Positive for postnasal drip. Negative for congestion, ear pain, sore throat and trouble swallowing.    Eyes:  Negative for pain, discharge and redness.   Respiratory:  Negative for cough and shortness of breath.    Cardiovascular:  Negative for chest pain and palpitations.   Gastrointestinal:  Negative for abdominal pain, blood in stool and vomiting.   Endocrine: Negative for polydipsia, polyphagia and polyuria.   Genitourinary:  Negative for dysuria,  frequency, hematuria and urgency.   Musculoskeletal:  Positive for neck pain. Negative for back pain.   Skin:  Negative for rash and wound.   Allergic/Immunologic: Negative for immunocompromised state.   Neurological:  Negative for dizziness, syncope and headaches.   Hematological:  Negative for adenopathy. Does not bruise/bleed easily.   Psychiatric/Behavioral:  Negative for confusion and hallucinations.        Objective   Visit Vitals  /69   Pulse 78   Temp 36.3 °C (97.3 °F)      BP Readings from Last 3 Encounters:   03/14/25 107/69   01/07/25 93/62   05/08/24 116/73     Wt Readings from Last 3 Encounters:   03/14/25 60.8 kg (134 lb)   01/07/25 61.9 kg (136 lb 6 oz)   11/21/24 59 kg (130 lb)           Physical Exam  Constitutional:       General: She is not in acute distress.     Appearance: Normal appearance. She is not ill-appearing.   HENT:      Head: Normocephalic.      Right Ear: Tympanic membrane, ear canal and external ear normal.      Left Ear: Tympanic membrane, ear canal and external ear normal.      Nose: Nose normal.      Mouth/Throat:      Mouth: Mucous membranes are moist.      Pharynx: Oropharynx is clear.   Eyes:      Extraocular Movements: Extraocular movements intact.      Conjunctiva/sclera: Conjunctivae normal.      Pupils: Pupils are equal, round, and reactive to light.   Cardiovascular:      Rate and Rhythm: Normal rate and regular rhythm.      Heart sounds: Normal heart sounds. No murmur heard.  Pulmonary:      Effort: Pulmonary effort is normal. No respiratory distress.      Breath sounds: Normal breath sounds. No wheezing, rhonchi or rales.   Abdominal:      General: Bowel sounds are normal.      Palpations: Abdomen is soft. There is no mass.      Tenderness: There is no abdominal tenderness.   Musculoskeletal:         General: No swelling or tenderness. Normal range of motion.      Cervical back: Normal range of motion and neck supple.      Right lower leg: No edema.      Left lower  leg: No edema.      Comments: Neck- no redness rash or swelling. FROM. No pain to palpation   Skin:     General: Skin is warm.      Findings: No rash.   Neurological:      General: No focal deficit present.      Mental Status: She is alert and oriented to person, place, and time.      Cranial Nerves: No cranial nerve deficit.      Motor: No weakness.   Psychiatric:         Mood and Affect: Mood normal.         Behavior: Behavior normal.         Assessment/Plan   Diagnoses and all orders for this visit:  Routine general medical examination at a health care facility  -     Comprehensive Metabolic Panel; Future  -     CBC and Auto Differential; Future  -     Lipid Panel; Future  -     TSH with reflex to Free T4 if abnormal; Future  BMI 22.0-22.9, adult  Neck pain  -     Referral to Physical Therapy; Future  Post-nasal drip  -     montelukast (Singulair) 10 mg tablet; Take 1 tablet (10 mg) by mouth once daily at bedtime.  Other orders  -     Follow Up In Primary Care - Health Maintenance; Future      See patient instructions for full plan

## 2025-03-14 ENCOUNTER — APPOINTMENT (OUTPATIENT)
Dept: PRIMARY CARE | Facility: CLINIC | Age: 40
End: 2025-03-14
Payer: COMMERCIAL

## 2025-03-14 VITALS
OXYGEN SATURATION: 98 % | HEART RATE: 78 BPM | BODY MASS INDEX: 22.33 KG/M2 | DIASTOLIC BLOOD PRESSURE: 69 MMHG | HEIGHT: 65 IN | TEMPERATURE: 97.3 F | SYSTOLIC BLOOD PRESSURE: 107 MMHG | WEIGHT: 134 LBS

## 2025-03-14 DIAGNOSIS — M54.2 NECK PAIN: ICD-10-CM

## 2025-03-14 DIAGNOSIS — R09.82 POST-NASAL DRIP: ICD-10-CM

## 2025-03-14 DIAGNOSIS — Z00.00 ROUTINE GENERAL MEDICAL EXAMINATION AT A HEALTH CARE FACILITY: Primary | ICD-10-CM

## 2025-03-14 LAB
NON-UH HIE A/G RATIO: 1
NON-UH HIE ALB: 3.9 G/DL (ref 3.4–5)
NON-UH HIE ALK PHOS: 50 UNIT/L (ref 45–117)
NON-UH HIE BASO COUNT: 0.09 X1000 (ref 0–0.2)
NON-UH HIE BASOS %: 1.4 %
NON-UH HIE BILIRUBIN, TOTAL: 0.7 MG/DL (ref 0.3–1.2)
NON-UH HIE BUN/CREAT RATIO: 17.5
NON-UH HIE BUN: 14 MG/DL (ref 9–23)
NON-UH HIE CALCIUM: 9.7 MG/DL (ref 8.7–10.4)
NON-UH HIE CALCULATED LDL CHOLESTEROL: 81 MG/DL (ref 60–130)
NON-UH HIE CALCULATED OSMOLALITY: 278 MOSM/KG (ref 275–295)
NON-UH HIE CHLORIDE: 104 MMOL/L (ref 98–107)
NON-UH HIE CHOLESTEROL: 166 MG/DL (ref 100–200)
NON-UH HIE CO2, VENOUS: 27 MMOL/L (ref 20–31)
NON-UH HIE CREATININE: 0.8 MG/DL (ref 0.5–0.8)
NON-UH HIE DIFF?: ABNORMAL
NON-UH HIE EOS COUNT: 0.17 X1000 (ref 0–0.5)
NON-UH HIE EOSIN %: 2.7 %
NON-UH HIE GFR AA: >60
NON-UH HIE GLOBULIN: 3.8 G/DL
NON-UH HIE GLOMERULAR FILTRATION RATE: >60 ML/MIN/1.73M?
NON-UH HIE GLUCOSE: 95 MG/DL (ref 74–106)
NON-UH HIE GOT: 18 UNIT/L (ref 15–37)
NON-UH HIE GPT: 13 UNIT/L (ref 10–49)
NON-UH HIE HCT: 38.4 % (ref 36–46)
NON-UH HIE HDL CHOLESTEROL: 75 MG/DL (ref 40–60)
NON-UH HIE HGB: 12.9 G/DL (ref 12–16)
NON-UH HIE INSTR WBC: 6.2
NON-UH HIE K: 4.3 MMOL/L (ref 3.5–5.1)
NON-UH HIE LYMPH %: 46.7 %
NON-UH HIE LYMPH COUNT: 2.89 X1000 (ref 1.2–4.8)
NON-UH HIE MCH: 31.7 PG (ref 27–34)
NON-UH HIE MCHC: 33.5 G/DL (ref 32–37)
NON-UH HIE MCV: 94.7 FL (ref 80–100)
NON-UH HIE MONO %: 8 %
NON-UH HIE MONO COUNT: 0.5 X1000 (ref 0.1–1)
NON-UH HIE MPV: 8.3 FL (ref 7.4–10.4)
NON-UH HIE NA: 139 MMOL/L (ref 135–145)
NON-UH HIE NEUTROPHIL %: 41.2 %
NON-UH HIE NEUTROPHIL COUNT (ANC): 2.55 X1000 (ref 1.4–8.8)
NON-UH HIE NUCLEATED RBC: 0 /100WBC
NON-UH HIE PLATELET: 346 X10 (ref 150–450)
NON-UH HIE RBC: 4.06 X10 (ref 4.2–5.4)
NON-UH HIE RDW: 13.9 % (ref 11.5–14.5)
NON-UH HIE TOTAL CHOL/HDL CHOL RATIO: 2.2
NON-UH HIE TOTAL PROTEIN: 7.7 G/DL (ref 5.7–8.2)
NON-UH HIE TRIGLYCERIDES: 49 MG/DL (ref 30–150)
NON-UH HIE TSH: 2.14 UIU/ML (ref 0.55–4.78)
NON-UH HIE WBC: 6.2 X10 (ref 4.5–11)

## 2025-03-14 PROCEDURE — 99213 OFFICE O/P EST LOW 20 MIN: CPT | Performed by: NURSE PRACTITIONER

## 2025-03-14 PROCEDURE — 3008F BODY MASS INDEX DOCD: CPT | Performed by: NURSE PRACTITIONER

## 2025-03-14 PROCEDURE — 1036F TOBACCO NON-USER: CPT | Performed by: NURSE PRACTITIONER

## 2025-03-14 PROCEDURE — 99395 PREV VISIT EST AGE 18-39: CPT | Performed by: NURSE PRACTITIONER

## 2025-03-14 RX ORDER — MONTELUKAST SODIUM 10 MG/1
10 TABLET ORAL NIGHTLY
Qty: 30 TABLET | Refills: 5 | Status: SHIPPED | OUTPATIENT
Start: 2025-03-14 | End: 2025-09-10

## 2025-03-14 ASSESSMENT — PATIENT HEALTH QUESTIONNAIRE - PHQ9
SUM OF ALL RESPONSES TO PHQ9 QUESTIONS 1 AND 2: 0
1. LITTLE INTEREST OR PLEASURE IN DOING THINGS: NOT AT ALL
2. FEELING DOWN, DEPRESSED OR HOPELESS: NOT AT ALL

## 2025-03-14 NOTE — PATIENT INSTRUCTIONS
Physical therapy for neck    Singulair for post nasal drip        Handouts given to pt:  physical handout        Labs- No appt needed:    You can use the lab in our building when fasting. The hrs are: Mon-Fri 7a-330p.  No Saturday hrs. Bring the paper order.   OR   [facility] Mon-Fri 7a-12p. No Saturday hrs. Bring the paper order.  OR   [facility] Mon-Fri 630a-530p or Sat 6:30a-12p. Bring the paper order  OR  [facility] Mon-FrI 7a-5p  [facility] Mon-Fri 730a-4p, Sat  8a-12p  [facility] [address] Mon-Thurs 630a-6p , Fri 630a-4p, Sat 8a-12p  [facility] [address] Mon-Fri 7a-630p and Sat. 7a-3p    Fasting is no food, drink, gum or mints other than water for 12 hrs.   Results will be back in 2-3 business days for most labs. It is always recommended for any orders (labs, xrays, ultrasounds,MRI, ct scan, procedures etc) to check with your insurance provider for expected costs or expenses to you.       You will get your results via phone from my medical assistant if you do not have MyChart.  OR  You will get your results via [employer]    If a result is urgent, I will call to speak to you.    Vaccines:  Up to date    General recommendations:  Exercise-cardio 4-5d/wk 30min each day  Diet-Breakfast-toast (my favorite Emma Jules Delighful Multigrain or Stuart's Killer Bread Good Seed thin-sliced)/bagel/English muffin-whole wheat flour as a 1st ingredient or cereal/oatmeal/granola bar-fiber 4g or more or protein like eggs or peanut butter; optional veggies  Lunch-protein, 1/2c carb or 2 slices bread, veg 1c  Dinner-protein, fist sized carb, veg 1c  Fruit 2 a day  Dairy 2 a day-milk, soy milk, almond milk, cheese, yogurt, cottage cheese  Snacks-Protein-hard boiled egg, nuts (walnuts/almonds/pecans/pistachios 1/4c), hummus, beef/deer jerky or meat sticks; vegetable, fruit, dairy-milk(1%, skim, almond, soy)/cheese (not a lot of cheddar)/yogurt (Greek is best-my favorite  Dannon Fruit on the Bottom Greek)/cottage cheese 2%; triscuits/ popcorn/wheat thins have a lot of fiber; follow serving size on bag/box/container  increase water  Limit alcohol to 1 drink per day for women and 2 drinks per day for men (1 drink=12oz beer or 5oz wine or 1 1/2oz liquor)  Calcium: 500mg 1 twice a day if age 50 and younger and 600mg 1 twice a day if over age 50 (calcium citrate can be taken without food)  Vitamin D: 800-5000 IU/day  Limit salt to <2300mg a day if age 50 and under and <1500mg a day if over age 50/have high bp or diabetes or kidney disease  Recommend folate for childbearing age women 0.4mg per day (can be found in a multivitamin)  Recommend 18mg/dL of iron a day if age 50 and under and 8mg/dL a day if over age 50; take on an empty stomach at bedtime  Use sunscreen   Wear seatbelt  Recommend safe sex practices: using condoms everytime you have sex, discuss with a new partner about their past partners/history of STDs/drug use, avoid drinking alcohol or using drugs as this increases the chance that you will participate in high-risk sex, for oral sex help protect your mouth by having your partner use a condom (male or female), women should not douche after sex, be aware of your partner's body and your body-look for signs of a sore, blister, rash, or discharge, and have regular exams and periodic tests for STDs.  No distracted driving  No driving when under influence of substances  Wear a seatbelt  Eye dr every 1-2yrs  Dentist every 6-12 mon  Tetanus shot every 10yrs  Recommend flu vaccine in the fall  Appt in  1 year for physical      I will communicate with you via [facility] regarding messages and results. If you need help with this, you can call the support line at [phone].    IT WAS A PLEASURE TO SEE YOU TODAY. THANK YOU FOR CHOOSING US FOR YOUR HEALTHCARE NEEDS.

## 2025-03-25 ENCOUNTER — LAB (OUTPATIENT)
Dept: LAB | Facility: HOSPITAL | Age: 40
End: 2025-03-25
Payer: COMMERCIAL

## 2025-03-25 DIAGNOSIS — Z31.41 FERTILITY TESTING: ICD-10-CM

## 2025-03-25 LAB
ABO GROUP (TYPE) IN BLOOD: NORMAL
ANTIBODY SCREEN: NORMAL
RH FACTOR (ANTIGEN D): NORMAL

## 2025-03-25 PROCEDURE — 86906 BLD TYPING SEROLOGIC RH PHNT: CPT

## 2025-03-25 PROCEDURE — 86870 RBC ANTIBODY IDENTIFICATION: CPT

## 2025-03-25 PROCEDURE — 86901 BLOOD TYPING SEROLOGIC RH(D): CPT

## 2025-03-25 PROCEDURE — 86880 COOMBS TEST DIRECT: CPT

## 2025-03-25 PROCEDURE — 86905 BLOOD TYPING RBC ANTIGENS: CPT

## 2025-03-25 PROCEDURE — 86860 RBC ANTIBODY ELUTION: CPT

## 2025-03-25 PROCEDURE — 86900 BLOOD TYPING SEROLOGIC ABO: CPT

## 2025-04-03 LAB — MIS SERPL-MCNC: 1.17 NG/ML (ref 0.18–5.68)

## 2025-04-04 LAB
BB ANTIBODY IDENTIFICATION: NORMAL
CASE #: NORMAL

## 2025-04-23 ENCOUNTER — INITIAL PRENATAL (OUTPATIENT)
Dept: MATERNAL FETAL MEDICINE | Facility: CLINIC | Age: 40
End: 2025-04-23
Payer: COMMERCIAL

## 2025-04-23 DIAGNOSIS — R76.8 RED BLOOD CELL ANTIBODY POSITIVE: ICD-10-CM

## 2025-04-23 DIAGNOSIS — Z31.69 ENCOUNTER FOR PRECONCEPTION CONSULTATION: Primary | ICD-10-CM

## 2025-04-23 PROCEDURE — 99214 OFFICE O/P EST MOD 30 MIN: CPT | Performed by: OBSTETRICS & GYNECOLOGY

## 2025-04-23 NOTE — PROGRESS NOTES
2025   Zoe Wong     Virtual or Telephone Consent    An interactive audio and video telecommunication system which permits real time communications between the patient (at the originating site) and provider (at the distant site) was utilized to provide this telehealth service. However due to connectivity issues this vist was ultimately converted to audio only.  Verbal consent was requested and obtained from Zoe Wong on this date, 25 for a telehealth visit and the patient's location was confirmed at the time of the visit.      M CONSULT NOTE  Referring Clinician: Yennifer Tripp  Reason for consult: anti-D, anti-G Abs    HPI: Zoe Wong is a 39 y.o.  at here for preconception consult    Doing well today without acute complaints.    To review her history of alloimmunization.  In her first pregnancy she had a SAB with D&C.  IN her next pregnancy her son was born at term but had prolonged jaundice requiring phototherapy and transfusion with a 19 day NICU stay.  She notes that in that pregnancy she was told postpartum that she did not need rhogam and suspects this was because she had anti-D Abs already.  She does not recall having had serial titers performed or having undergone US surveillance.  She has never had a transfusion.    Recently she had a type and screen that demonstrated anti-D and anti-G Abs.  No titer was performed.    10 point review of system is negative except as above    OB History  OB History    Para Term  AB Living   2 1 1 0 1 1   SAB IAB Ectopic Multiple Live Births   1 0 0 0 1      # Outcome Date GA Lbr Chente/2nd Weight Sex Type Anes PTL Lv   2 Term 08 40w0d  3.5 kg M Vag-Spont   BRANDEE      Complications: GDM, class A1 ([facility])   1 SAB               Birth Comments: had D&C      Obstetric Comments   - 1 son who is 15 years old, , gdm, induced at 39 weeks, jaundice- NICU 19 days for severe jaundice and had blood transfusions,       1 SAB prior  2007- D&C       Medical History  Medical History[1]    Surgical History  Surgical History[2]    Family History  family history includes Breast cancer in her maternal grandmother; Diabetes in her father; Hypertension in her father and mother; Hyperthyroidism in her mother.    Social History  Social History[3]    Allergies  RX Allergies[4]    Medications:  Medication Documentation Review Audit       Reviewed by Michael Cantu MD (Physician) on 25 at 1730      Medication Order Taking? Sig Documenting Provider Last Dose Status   Discontinued 25 1213   loratadine (CLARITIN ORAL) [id number] No Take 1 tablet by mouth once daily as needed. Historical Provider, MD Taking Active     Discontinued 25 1213     Discontinued 25 1212   propranolol (Inderal) 20 mg tablet [id number]  Take 1 tablet (20 mg) by mouth once daily. Winifred Barajas,   Active   ZOLMitriptan (Zomig) 5 mg tablet [id number] No Take 1 tablet (5 mg) by mouth 1 time if needed. Historical Provider, MD Taking Active                    OBJECTIVE  Visit Vitals  OB Status Having periods   Smoking Status Never       Physical exam  Gen: NAD  HEENT: EOMI, CN2-12 intact  Pulm: non-labored    ASSESSMENT & PLAN    Zoe Wong is a 39 y.o.  at here for the followin. Alloimmunization, anti-D and anti-G Abs  Given her history it appears likely that she had alloimmunization already in her second pregnancy and it appears likely that this occurred at the time of her first pregnancy/SAB.   I reviewed with the potential for severe erythroblastosis that can be associated with antibodies to D antigen.   Alloimmunization occurs after exposure to an antigen mismatched blood transfusion or feto-maternal hemorrhage.   In order for the fetus to develop hemolytic disease of the fetus and  (HDFN), the fetus needs to express the D antigen.  In addition, the antibody needs to be in a high enough titer (1:16).    The risk of the fetus inheriting  the D antigen is dependent on the paternal genotype and she notes that her partner is D positive.  We did discuss the option of direct fetal RBC genotyping through amniocentesis, however, given that the likelihood of fetal presence of the D antigen is, at minimum, will plan for surveillance in future pregnancy.  I discussed the role of serial titers and MCA doppler surveillance starting at 16 weeks for a titer 1:16 or higher.  I also briefly discussed the role of IUT if fetal anemia is suspected.    I noted that ultimately the determination of her surveillance will be based on titer level during pregnancy, but as she was interested in knowing what her current titer is preconception I did place an order for Ab titer today.     In summary the following is recommended during pregnancy:    Monthly Ab titers until 24 weeks  At 24 weeks Ab titers should be performed every 2 weeks  If titer is 1:16 or higher then serial MCA doppler interrogation is recommended starting at 16 weeks.  If titer of 1:16 or higher is identified consultation with MFM is recommended.    Thank you for allowing us to participate in the care of your patient.  A see no overt contraindication to pregnancy at this time.    I spent 45 minutes in the professional and overall care of this patient.    Michael Cantu MD  Maternal Fetal Medicine       [1]   Past Medical History:  Diagnosis Date    Allergy status to unspecified drugs, medicaments and biological substances     History of seasonal allergies    Anxiety     Encounter for screening for infections with a predominantly sexual mode of transmission 12/09/2019    Screen for STD (sexually transmitted disease)    Headache     Migraines     Personal history of other infectious and parasitic diseases     History of infection due to human papilloma virus (HPV)    Personal history of other specified conditions 11/11/2021    History of insomnia    Personal history of urinary (tract) infections     History  of bladder infections   [2]   Past Surgical History:  Procedure Laterality Date    ADENOIDECTOMY  11/09/2016    Adenoidectomy    APPENDECTOMY  11/09/2016    Appendectomy    DILATION AND CURETTAGE OF UTERUS      HEMORRHOID SURGERY  11/09/2016    Hemorrhoidectomy    OTHER SURGICAL HISTORY  06/14/2022    Breast biopsy   [3]   Social History  Tobacco Use    Smoking status: Never    Smokeless tobacco: Never   Vaping Use    Vaping status: Never Used   Substance Use Topics    Alcohol use: Yes     Alcohol/week: 2.0 standard drinks of alcohol     Types: 2 Glasses of wine per week     Comment: social    Drug use: Never   [4]   Allergies  Allergen Reactions    Bee Pollen Unknown

## 2025-04-23 NOTE — LETTER
2025     Yennifer Tripp, APRN-CNP  [address]  [facility], [place] [address], [address]  [facility] [address]    Patient: Zoe Wong   YOB: 1985   Date of Visit: 2025       Dear Dr. Yennifer Tripp, APRN-CNP:    Thank you for referring Zoe Wong to me for evaluation. Below are my notes for this consultation.  If you have questions, please do not hesitate to call me. I look forward to following your patient along with you.       Sincerely,     Michael Cantu MD      CC: No Recipients  ______________________________________________________________________________________    2025   Zoe Churchillbritney     Virtual or Telephone Consent    An interactive audio and video telecommunication system which permits real time communications between the patient (at the originating site) and provider (at the distant site) was utilized to provide this telehealth service. However due to connectivity issues this vist was ultimately converted to audio only.  Verbal consent was requested and obtained from Zoe Wong on this date, 25 for a telehealth visit and the patient's location was confirmed at the time of the visit.      [facility] CONSULT NOTE  Referring Clinician: Yennifer Tripp  Reason for consult: anti-D, anti-G Abs    HPI: Zoe Wong is a 39 y.o.  at here for preconception consult    Doing well today without acute complaints.    To review her history of alloimmunization.  In her first pregnancy she had a SAB with D&C.  IN her next pregnancy her son was born at term but had prolonged jaundice requiring phototherapy and transfusion with a 19 day NICU stay.  She notes that in that pregnancy she was told postpartum that she did not need rhogam and suspects this was because she had anti-D Abs already.  She does not recall having had serial titers performed or having undergone US surveillance.  She has never had a transfusion.    Recently she had a type and screen that  demonstrated anti-D and anti-G Abs.  No titer was performed.    10 point review of system is negative except as above    OB History  OB History    Para Term  AB Living   2 1 1 0 1 1   SAB IAB Ectopic Multiple Live Births   1 0 0 0 1      # Outcome Date GA Lbr Chente/2nd Weight Sex Type Anes PTL Lv   2 Term 08 40w0d  3.5 kg M Vag-Spont   BRANDEE      Complications: GDM, class A1 ([facility])   1 SAB               Birth Comments: had D&C      Obstetric Comments   - 1 son who is 15 years old, , gdm, induced at 39 weeks, jaundice- NICU 19 days for severe jaundice and had blood transfusions,       1 SAB prior - D&C       Medical History  Medical History[1]    Surgical History  Surgical History[2]    Family History  family history includes Breast cancer in her maternal grandmother; Diabetes in her father; Hypertension in her father and mother; Hyperthyroidism in her mother.    Social History  Social History[3]    Allergies  RX Allergies[4]    Medications:  Medication Documentation Review Audit       Reviewed by Michael Cantu MD (Physician) on 25 at 1730      Medication Order Taking? Sig Documenting Provider Last Dose Status   Discontinued 25 1213   loratadine (CLARITIN ORAL) [id number] No Take 1 tablet by mouth once daily as needed. Historical Provider, MD Taking Active     Discontinued 25 1213     Discontinued 25 1212   propranolol (Inderal) 20 mg tablet [id number]  Take 1 tablet (20 mg) by mouth once daily. Winifred Barajas,   Active   ZOLMitriptan (Zomig) 5 mg tablet [id number] No Take 1 tablet (5 mg) by mouth 1 time if needed. Historical Provider, MD Taking Active                    OBJECTIVE  Visit Vitals  OB Status Having periods   Smoking Status Never       Physical exam  Gen: NAD  HEENT: EOMI, CN2-12 intact  Pulm: non-labored    ASSESSMENT & PLAN    Zoe Wong is a 39 y.o.  at here for the followin. Alloimmunization, anti-D and anti-G Abs  Given  her history it appears likely that she had alloimmunization already in her second pregnancy and it appears likely that this occurred at the time of her first pregnancy/SAB.   I reviewed with the potential for severe erythroblastosis that can be associated with antibodies to D antigen.   Alloimmunization occurs after exposure to an antigen mismatched blood transfusion or feto-maternal hemorrhage.   In order for the fetus to develop hemolytic disease of the fetus and  (HDFN), the fetus needs to express the D antigen.  In addition, the antibody needs to be in a high enough titer (1:16).    The risk of the fetus inheriting the D antigen is dependent on the paternal genotype and she notes that her partner is D positive.  We did discuss the option of direct fetal RBC genotyping through amniocentesis, however, given that the likelihood of fetal presence of the D antigen is, at minimum, will plan for surveillance in future pregnancy.  I discussed the role of serial titers and MCA doppler surveillance starting at 16 weeks for a titer 1:16 or higher.  I also briefly discussed the role of IUT if fetal anemia is suspected.    I noted that ultimately the determination of her surveillance will be based on titer level during pregnancy, but as she was interested in knowing what her current titer is preconception I did place an order for Ab titer today.     In summary the following is recommended during pregnancy:    Monthly Ab titers until 24 weeks  At 24 weeks Ab titers should be performed every 2 weeks  If titer is 1:16 or higher then serial MCA doppler interrogation is recommended starting at 16 weeks.  If titer of 1:16 or higher is identified consultation with MFM is recommended.    Thank you for allowing us to participate in the care of your patient.  A see no overt contraindication to pregnancy at this time.    I spent 45 minutes in the professional and overall care of this patient.    Michael Cantu MD  Maternal  Fetal Medicine       [1]  Past Medical History:  Diagnosis Date   • Allergy status to unspecified drugs, medicaments and biological substances     History of seasonal allergies   • Anxiety    • Encounter for screening for infections with a predominantly sexual mode of transmission 12/09/2019    Screen for STD (sexually transmitted disease)   • Headache    • Migraines    • Personal history of other infectious and parasitic diseases     History of infection due to human papilloma virus (HPV)   • Personal history of other specified conditions 11/11/2021    History of insomnia   • Personal history of urinary (tract) infections     History of bladder infections   [2]  Past Surgical History:  Procedure Laterality Date   • ADENOIDECTOMY  11/09/2016    Adenoidectomy   • APPENDECTOMY  11/09/2016    Appendectomy   • DILATION AND CURETTAGE OF UTERUS     • HEMORRHOID SURGERY  11/09/2016    Hemorrhoidectomy   • OTHER SURGICAL HISTORY  06/14/2022    Breast biopsy   [3]  Social History  Tobacco Use   • Smoking status: Never   • Smokeless tobacco: Never   Vaping Use   • Vaping status: Never Used   Substance Use Topics   • Alcohol use: Yes     Alcohol/week: 2.0 standard drinks of alcohol     Types: 2 Glasses of wine per week     Comment: social   • Drug use: Never   [4]  Allergies  Allergen Reactions   • Bee Pollen Unknown        [1]  Past Medical History:  Diagnosis Date   • Allergy status to unspecified drugs, medicaments and biological substances     History of seasonal allergies   • Anxiety    • Encounter for screening for infections with a predominantly sexual mode of transmission 12/09/2019    Screen for STD (sexually transmitted disease)   • Headache    • Migraines    • Personal history of other infectious and parasitic diseases     History of infection due to human papilloma virus (HPV)   • Personal history of other specified conditions 11/11/2021    History of insomnia   • Personal history of urinary (tract) infections      History of bladder infections   [2]  Past Surgical History:  Procedure Laterality Date   • ADENOIDECTOMY  11/09/2016    Adenoidectomy   • APPENDECTOMY  11/09/2016    Appendectomy   • DILATION AND CURETTAGE OF UTERUS     • HEMORRHOID SURGERY  11/09/2016    Hemorrhoidectomy   • OTHER SURGICAL HISTORY  06/14/2022    Breast biopsy   [3]  Social History  Tobacco Use   • Smoking status: Never   • Smokeless tobacco: Never   Vaping Use   • Vaping status: Never Used   Substance Use Topics   • Alcohol use: Yes     Alcohol/week: 2.0 standard drinks of alcohol     Types: 2 Glasses of wine per week     Comment: social   • Drug use: Never   [4]  Allergies  Allergen Reactions   • Bee Pollen Unknown

## 2025-04-28 ENCOUNTER — TELEPHONE (OUTPATIENT)
Dept: ENDOCRINOLOGY | Facility: CLINIC | Age: 40
End: 2025-04-28
Payer: COMMERCIAL

## 2025-04-28 DIAGNOSIS — Z11.3 SCREEN FOR STD (SEXUALLY TRANSMITTED DISEASE): ICD-10-CM

## 2025-04-28 DIAGNOSIS — Z13.29 SCREENING FOR THYROID DISORDER: Primary | ICD-10-CM

## 2025-04-28 NOTE — TELEPHONE ENCOUNTER
Called patient with remaining checklist items.   -patient STD's- urine and blood sample  -consent forms  -schedule hyster (call with first day of period to schedule)  -partner GC/CT and schedule sperm freeze  Discussed with patient when she calls in to schedule hysteroscopy  she can speak with a nurse as well to touch base regarding checklist and getting set up for IVF cycle.     Diana Jimenez 04/28/25 3:21 PM

## 2025-04-28 NOTE — TELEPHONE ENCOUNTER
Reason for call:   Notes: Patient completed her MFM appt and now would like to discuss next steps.

## 2025-05-07 ENCOUNTER — APPOINTMENT (OUTPATIENT)
Dept: NEUROLOGY | Facility: CLINIC | Age: 40
End: 2025-05-07
Payer: COMMERCIAL

## 2025-05-10 LAB
C TRACH RRNA SPEC QL NAA+PROBE: NOT DETECTED
HBV SURFACE AG SERPL QL IA: NORMAL
HCV AB SERPL QL IA: NORMAL
HIV 1+2 AB+HIV1 P24 AG SERPL QL IA: NORMAL
N GONORRHOEA RRNA SPEC QL NAA+PROBE: NOT DETECTED
QUEST GC CT AMPLIFIED (ALWAYS MESSAGE): NORMAL
T PALLIDUM AB SER QL IA: NEGATIVE
TSH SERPL-ACNC: 2.03 MIU/L

## 2025-05-16 ENCOUNTER — TELEPHONE (OUTPATIENT)
Dept: ENDOCRINOLOGY | Facility: CLINIC | Age: 40
End: 2025-05-16
Payer: COMMERCIAL

## 2025-05-16 DIAGNOSIS — N97.9 FEMALE INFERTILITY: ICD-10-CM

## 2025-05-16 RX ORDER — NORGESTIMATE AND ETHINYL ESTRADIOL 0.25-0.035
1 KIT ORAL DAILY
Qty: 28 TABLET | Refills: 2 | Status: SHIPPED | OUTPATIENT
Start: 2025-05-16 | End: 2026-05-16

## 2025-05-16 NOTE — TELEPHONE ENCOUNTER
Reason for call: reporting start of cycle  LMP:5/14/25  Treatment type:   Hysteroscopy: Is biopsy needed?   Note:  She also is calling to talk to a nurse about IVF next steps.

## 2025-05-16 NOTE — TELEPHONE ENCOUNTER
Returned call to patient. Patient scheduled for hysteroscopy 5/22. Reviewed other remaining checklist items: Patient and partner to complete consent forms, partner to schedule sperm freeze. Patient has decided she would like to do PGT testing, updated BP. Message sent to Dr. Hollingsworth for IVF medication doses now that patient has updated AMH. Patient to call with next period to schedule baseline and nurse teach ans start OCP's, at this time she will be added to start calendar and given tentative calendar. Once medication doses are confirmed orders will be placed.   Nydia Manriquez 05/16/25 10:23 AM

## 2025-05-21 ENCOUNTER — PREP FOR PROCEDURE (OUTPATIENT)
Dept: ENDOCRINOLOGY | Facility: CLINIC | Age: 40
End: 2025-05-21
Payer: COMMERCIAL

## 2025-05-21 RX ORDER — ACETAMINOPHEN 325 MG/1
650 TABLET ORAL ONCE AS NEEDED
Status: CANCELLED | OUTPATIENT
Start: 2025-05-21

## 2025-05-21 RX ORDER — KETOROLAC TROMETHAMINE 30 MG/ML
30 INJECTION, SOLUTION INTRAMUSCULAR; INTRAVENOUS ONCE AS NEEDED
Status: CANCELLED | OUTPATIENT
Start: 2025-05-21 | End: 2025-05-26

## 2025-05-22 ENCOUNTER — HOSPITAL ENCOUNTER (OUTPATIENT)
Dept: ENDOCRINOLOGY | Facility: CLINIC | Age: 40
Discharge: HOME | End: 2025-05-22
Payer: COMMERCIAL

## 2025-05-22 ENCOUNTER — APPOINTMENT (OUTPATIENT)
Dept: ENDOCRINOLOGY | Facility: CLINIC | Age: 40
End: 2025-05-22
Payer: COMMERCIAL

## 2025-05-22 VITALS
RESPIRATION RATE: 16 BRPM | HEART RATE: 63 BPM | TEMPERATURE: 97.7 F | SYSTOLIC BLOOD PRESSURE: 105 MMHG | WEIGHT: 135.8 LBS | DIASTOLIC BLOOD PRESSURE: 70 MMHG | HEIGHT: 64 IN | BODY MASS INDEX: 23.18 KG/M2 | OXYGEN SATURATION: 100 %

## 2025-05-22 DIAGNOSIS — N84.0 ENDOMETRIAL POLYP: Primary | ICD-10-CM

## 2025-05-22 DIAGNOSIS — Z31.41 FERTILITY TESTING: ICD-10-CM

## 2025-05-22 LAB — PREGNANCY TEST URINE, POC: NEGATIVE

## 2025-05-22 PROCEDURE — 58555 HYSTEROSCOPY DX SEP PROC: CPT | Mod: GC | Performed by: STUDENT IN AN ORGANIZED HEALTH CARE EDUCATION/TRAINING PROGRAM

## 2025-05-22 PROCEDURE — 58555 HYSTEROSCOPY DX SEP PROC: CPT | Performed by: STUDENT IN AN ORGANIZED HEALTH CARE EDUCATION/TRAINING PROGRAM

## 2025-05-22 RX ORDER — KETOROLAC TROMETHAMINE 30 MG/ML
30 INJECTION, SOLUTION INTRAMUSCULAR; INTRAVENOUS ONCE AS NEEDED
Status: DISCONTINUED | OUTPATIENT
Start: 2025-05-22 | End: 2025-05-23 | Stop reason: HOSPADM

## 2025-05-22 RX ORDER — ACETAMINOPHEN 325 MG/1
650 TABLET ORAL ONCE AS NEEDED
Status: DISCONTINUED | OUTPATIENT
Start: 2025-05-22 | End: 2025-05-23 | Stop reason: HOSPADM

## 2025-05-22 NOTE — PROGRESS NOTES
Patient ID: Zoe Wong is a 39 y.o. female.    Hysteroscopy diagnostic    Date/Time: 5/22/2025 2:38 PM    Performed by: Rupal Kent MD  Authorized by: LOUISE Alvarez    Consent:     Consent obtained:  Verbal and written    Consent given by:  Patient    Risks, benefits, and alternatives were discussed: yes      Risks discussed:  Bleeding, infection and pain  Universal protocol:     Procedure explained and questions answered to patient or proxy's satisfaction: yes      Relevant documents present and verified: yes      Test results available: yes      Imaging studies available: yes      Required blood products, implants, devices, and special equipment available: yes      Immediately prior to procedure, a time out was called: yes      Patient identity confirmed:  Verbally with patient, arm band and hospital-assigned identification number  Pre-procedure details:     Skin preparation:  Povidone-iodine  Procedure specific details:      Procedure: Diagnostic Hysteroscopy   Preop diagnosis: endometrial cavity evaluation  Post op diagnosis: Same   Assistant: none    Anesthesia: None   IV: None   EBL: 3 cc  Specimens: None   Complications: None   Risks benefits and alternatives of the procedure explained to the patient and informed consent was obtained. Urine pregnancy test was performed and was negative. Time out was performed. The patient was placed in the dorsal lithotomy position and a sterile speculum was placed in the vagina. The cervix was sterilized with Betadine x3. Paracervical block with lidocaine 1% was administered.   Tenaculum: YES  Dilation: NO  The hysteroscope was placed in the cervix and advanced into the uterine cavity. Normal saline was used for distension media. Images were obtained and findings noted as below.   All instruments were then removed. Good hemostasis was noted. Patient tolerated the procedure well returned to the recovery area in stable condition. .   Findings:   Cavity:  smooth cavity, small polyp by the right cornu  Ostia: Bilateral tubal ostia visualized  Additional Notes:  Patient would prefer to undergo oocyte retrieval first, have the polyp removed prior to the transfer.    Hysteroscopic polypectomy under anesthesia orders placed.  Plan discussed with Dr. Niki Kent 05/22/25 2:42 PM     Post-procedure details:     Procedure completion:  Tolerated well, no immediate complications

## 2025-06-04 ENCOUNTER — DOCUMENTATION (OUTPATIENT)
Dept: ENDOCRINOLOGY | Facility: CLINIC | Age: 40
End: 2025-06-04
Payer: COMMERCIAL

## 2025-06-04 ENCOUNTER — SPECIALTY PHARMACY (OUTPATIENT)
Dept: PHARMACY | Facility: CLINIC | Age: 40
End: 2025-06-04

## 2025-06-04 RX ORDER — GANIRELIX ACETATE 250 UG/.5ML
250 INJECTION, SOLUTION SUBCUTANEOUS EVERY MORNING
Qty: 5 EACH | Refills: 2 | Status: SHIPPED | OUTPATIENT
Start: 2025-06-04

## 2025-06-04 RX ORDER — CHORIONIC GONADOTROPIN 10000 UNIT
10000 KIT INTRAMUSCULAR ONCE AS NEEDED
Qty: 1 EACH | Refills: 0 | Status: SHIPPED | OUTPATIENT
Start: 2025-06-04

## 2025-06-04 RX ORDER — LEUPROLIDE ACETATE 1 MG/0.2ML
4 KIT SUBCUTANEOUS AS NEEDED
Qty: 1 KIT | Refills: 0 | Status: SHIPPED | OUTPATIENT
Start: 2025-06-04

## 2025-06-04 NOTE — PROGRESS NOTES
Fertility Plan Update:   AMH decreased to 1.17 3/2025    Updated Protocol:  Lead in: OCP  Stimulation protocol: Antagonist   menopur 150  Trigger plan: HCG vs Lupron  Pre-retrieval meds: Antibiotics per protocol  Adjuncts: none    Rizwana Prince 06/04/25 3:37 PM

## 2025-06-11 ENCOUNTER — DOCUMENTATION (OUTPATIENT)
Dept: ENDOCRINOLOGY | Facility: CLINIC | Age: 40
End: 2025-06-11
Payer: COMMERCIAL

## 2025-06-11 NOTE — PROGRESS NOTES
Patient wrote in via [employer] that she started her period today. Called patient, LMP-6/11. Patient aware to start her OCP's today and take daily (active pills only) and plan to take her last pill on 6/24. Sent tentative calendar to patient via [employer], she is aware that dates are subject to change. Patient scheduled for Nurse Teach appointment for Monday 6/23 and is scheduled for her baseline US/labs on 6/25. Patient aware to have all of her medications by the day of her baseline. Patient added to IVF start calendar. Patient planning for PGT-A. BP to be signed off today. Encouraged patient to call office with any questions or concerns.  Nydia Manriquez 06/11/25 11:12 AM

## 2025-06-18 PROCEDURE — RXMED WILLOW AMBULATORY MEDICATION CHARGE

## 2025-06-21 DIAGNOSIS — G25.0 ESSENTIAL TREMOR: ICD-10-CM

## 2025-06-23 ENCOUNTER — ANCILLARY PROCEDURE (OUTPATIENT)
Dept: ENDOCRINOLOGY | Facility: CLINIC | Age: 40
End: 2025-06-23
Payer: COMMERCIAL

## 2025-06-23 NOTE — PROGRESS NOTES
IVF NURSE CONSULT  Here for IVF Nurse consult.    Tentative Calendar given and reviewed. Yes    Consents sent via Engaged MD:      IVF: Yes  Genetic Auth: Yes  PAT form: Yes    Financial consult requirement reviewed with patient: Yes    Meds have been ordered from: [facility], will call today for same day delivery      Patient aware that plan is a freeze all cycle.   Yes  IC/ abstinence and activity guidelines reviewed. Yes  Next steps: Patient scheduled for IVF baseline on 6/25    Diana Jimenez  06/23/2025  9:17 AM

## 2025-06-24 ENCOUNTER — PHARMACY VISIT (OUTPATIENT)
Dept: PHARMACY | Facility: CLINIC | Age: 40
End: 2025-06-24
Payer: COMMERCIAL

## 2025-06-24 RX ORDER — PROPRANOLOL HYDROCHLORIDE 20 MG/1
20 TABLET ORAL DAILY
Qty: 90 TABLET | Refills: 1 | Status: SHIPPED | OUTPATIENT
Start: 2025-06-24

## 2025-06-25 ENCOUNTER — LAB REQUISITION (OUTPATIENT)
Dept: LAB | Facility: HOSPITAL | Age: 40
End: 2025-06-25
Payer: COMMERCIAL

## 2025-06-25 ENCOUNTER — ANCILLARY PROCEDURE (OUTPATIENT)
Dept: ENDOCRINOLOGY | Facility: CLINIC | Age: 40
End: 2025-06-25
Payer: COMMERCIAL

## 2025-06-25 ENCOUNTER — DOCUMENTATION (OUTPATIENT)
Dept: ENDOCRINOLOGY | Facility: CLINIC | Age: 40
End: 2025-06-25

## 2025-06-25 DIAGNOSIS — N97.9 FEMALE INFERTILITY, UNSPECIFIED: ICD-10-CM

## 2025-06-25 DIAGNOSIS — N97.9 FEMALE INFERTILITY: ICD-10-CM

## 2025-06-25 DIAGNOSIS — Z31.41 ENCOUNTER FOR FERTILITY TESTING: ICD-10-CM

## 2025-06-25 LAB
ERYTHROCYTE [DISTWIDTH] IN BLOOD BY AUTOMATED COUNT: 12.4 % (ref 11.5–14.5)
ESTRADIOL SERPL-MCNC: 27 PG/ML
HCT VFR BLD AUTO: 39.4 % (ref 36–46)
HGB BLD-MCNC: 12.5 G/DL (ref 12–16)
MCH RBC QN AUTO: 31.2 PG (ref 26–34)
MCHC RBC AUTO-ENTMCNC: 31.7 G/DL (ref 32–36)
MCV RBC AUTO: 98 FL (ref 80–100)
NRBC BLD-RTO: 0 /100 WBCS (ref 0–0)
PLATELET # BLD AUTO: 376 X10*3/UL (ref 150–450)
RBC # BLD AUTO: 4.01 X10*6/UL (ref 4–5.2)
WBC # BLD AUTO: 7.6 X10*3/UL (ref 4.4–11.3)

## 2025-06-25 PROCEDURE — 76857 US EXAM PELVIC LIMITED: CPT | Performed by: OBSTETRICS & GYNECOLOGY

## 2025-06-25 PROCEDURE — 76857 US EXAM PELVIC LIMITED: CPT

## 2025-06-25 PROCEDURE — 82670 ASSAY OF TOTAL ESTRADIOL: CPT

## 2025-06-25 PROCEDURE — 85027 COMPLETE CBC AUTOMATED: CPT

## 2025-06-25 NOTE — PROGRESS NOTES
PGT-A requisition sent via Email to [facility], placed in bin to be scanned to patient's chart.  Nydia Manriquez 06/25/25 4:00 PM

## 2025-06-25 NOTE — PROGRESS NOTES
NOEL NOTE - IVF STIM BASELINE  Patient presents for baseline ultrasound and/or labs. 39 year old patient of Dr. Hollingsworth AMH-1.17.    Treatment protocol: Antagonist  menopur 150  Trigger plan: HCG vs Lupron  Pre-retrieval meds: Antibiotics per protocol  Adjuncts: none  Lead in: OCP  Start date for lead in: 25  Last estrace/OCP date: 25  PGT-A/M? Yes; Req Sent: No ,to be sent; PGT-M Test Ready: No  ; Company: [facility]  PGT order scanned into Epic, confirmed by: to be sent today by RN  Plan to freeze: embryos    [employer] forms/out waiver complete:  Yes    Does patient have medications onhand?  Yes  Pharmacy: [facility]    Boarding pass signed off:  Yes    CBC reviewed by MD?  Yes    Date of Female STDs: 25    Date of Male STDs: 25 CT/GC  Needs to update blood work    Medically complex on boarding pass:   no    If indicated, is PAT consult complete?  N/A    SPERM:  partner  Fresh with Frozen Backup  Number of vials confirmed: 2 vials on  by   Sperm prep order placed yes    Additional details:   Nydia Manriquez  2025  7:13 AM    Cathleen Payton 25 1:39 PM    Called patient to review plan as discussed with providers. Patient to start her injections (, Menopur 150) on  and return to the office on  for a follicle scan and E2 level. Patient's call transferred to  to schedule for Tuesday.  message with plan sent to patient. Patient's partner has updated CT/GC from May but STD blood work is now . (Labs were still good at time of sperm freeze). Orders placed, patient and partner aware that he will need to complete these labs as soon as possible in order to proceed with a fresh collection the day of retrieval. Encouraged patient to call office with any questions or concerns.   Nydia Manriquez 25 2:14 PM

## 2025-06-26 ENCOUNTER — SPECIALTY PHARMACY (OUTPATIENT)
Dept: PHARMACY | Facility: CLINIC | Age: 40
End: 2025-06-26

## 2025-06-26 NOTE — PROGRESS NOTES
[facility] Clinical Note  Initial Patient Education- Fertility    Introduction  Zoe Wong is a 39 y.o. female who is on the specialty pharmacy service for management of: Fertility Core.    Zoe Wong is initiating the following therapy. Dose and directions will be documented below for each medication.   Follistim 900 IU cartridge, Menopur 75 unit vials, Ganirelix 250mcg syringes, Pregnyl 10,000IU vial for trigger , and Leuprolide 1mg/0.2mL kit for trigger    Medication receipt date: 6/24/25  Duration of therapy: Patient/Provider Specific- depends on response to medication therapy.     The most recent baseline note with the referring prescriber/doc of the day on 6/25/25 was reviewed.  Pharmacy will continue to collaborate in the care of this patient with the referring prescriber.    Medications should only be prescribed by fertility specialists for this indication.    Clinical Background  An initial assessment was conducted prior to first fill of the medication to determine the appropriateness of therapy given the patient's diagnosis, medication list, comorbidities, allergies, medical history, patient's ability to self administer medication, and therapeutic goals based on possible outcomes of therapy. Refer to initial assessment task completed on 6/4/25.    Labs for clinical appropriateness that were reviewed include:   Fertility - Estradiol level:   Lab Results   Component Value Date    ESTRADIOL 27 06/25/2025   , AMH:   Lab Results   Component Value Date    AMH 1.17 03/25/2025   , and BMI: 23.31    Education/Discussion  Zoe was contacted on 6/26/2025 at 6:06 PM for a pharmacy visit with encounter number [id number] from:   [facility]  [address] [place] [id number]  Dept: [phone]  Dept Fax: [phone]  Patient declined phone call, would like me to send all information and completed initial education through [employer]  system. System will update and show read receipt from patient once opened.    Medication Start Date (planned or actual): 6/28/25  Education was conducted prior to start of therapy? Yes    Education discussed includes the following:  Patient Education  Counseled the Patient on the Following : Doses and administration, Adherence and missed doses, Possible side effects and management, Safe handling, storage, and disposal, Contraindications and precautions, Pharmacy contact information  Learner: Patient  Education Method: Handout (phone call made to patient, she requested written education be sent to her)  Education Response: Verbalizes understanding  Additional details of the medication specific counseling are found within the linked patient education flowsheet.     The follow up timeline was discussed. Every person responds and reacts to therapy differently. Take as directed by your provider.  Adverse effects or efficacy to treatment can occur at any point during therapy. Recommended contacting the  Fertility Center after starting therapy and sooner if symptoms worsen or new symptoms develop.   Patient will be assessed for efficacy and tolerability in approximately: at end of IVF cycle for end of therapy call. Varies per patient based on response to medications.     Patient was encouraged to reach out to their doctor's office if they develop signs of an allergic reaction to any medication prescribed as part of their fertility treatment.    Provided education on goals and possible outcomes of therapy:  Successful administration of IVF Medication(s)  Stimulate ovaries to produce multiple eggs for retrieval (Gonal-F, Follistim, Menopur, Low Dose HCG)  Suppress ovaries to prevent premature ovulation (Ganirelix/Cetrotide, Microdose & Long Lupron)  Trigger ovulation to prepare for egg retrieval (Lupron/Pregnyl)    The importance of adherence was discussed and they were advised to take the medication as prescribed by their  provider.     Specific education was completed for the following medications:    Sent all information and completed initial education through MyChart messaging system. All medication information sent to the patient and read via [employer] message encounter 6/26/25 at 6:03PM.     Impression/Plan  Review and Assessment   Reviewed During This Encounter: Allergies, Medications, Problem list  Medications Assessed for Appropriate Use, Dose, Route, Frequency, and Duration: Yes  Medication Reconciliation Completed: Yes  Drug Interactions Evaluated: Yes  Clinically Relevant Drug Interactions Identified: No    This patient has not been identified as high risk.   The following action was taken: N/A.    QOL/Patient Satisfaction  Rate your quality of life on scale of 1-10: 10 - Completely satisfied  Rate your satisfaction with  Specialty Pharmacy on scale of 1-10: 10 - Completely satisfied    Provided contact information ([phone]) for [facility] and reviewed dispensing process, refill timeline, and patient management follow up. Advised to contact the pharmacy if there are any adverse effects and/or changes to medication list, including prescriptions, OTC medications, herbal products, or supplements. Confirmed understanding of education conducted during assessment. All questions and concerns were addressed and patient was encouraged to reach out for additional questions or concerns.    Sent to patient:   Below is the link for the [facility] Welcome Packet for your reference:    [email or web address]        Jackeline Tilley PharmD (Katie)  [facility]  Clinical Pharmacy Specialist- [facility], [place] [address]  [address] [place] [address]  Email: [email or web address]  Tel: [phone]       Fax:  [phone]

## 2025-07-01 ENCOUNTER — ANCILLARY PROCEDURE (OUTPATIENT)
Dept: ENDOCRINOLOGY | Facility: CLINIC | Age: 40
End: 2025-07-01
Payer: COMMERCIAL

## 2025-07-01 ENCOUNTER — LAB REQUISITION (OUTPATIENT)
Dept: LAB | Facility: HOSPITAL | Age: 40
End: 2025-07-01
Payer: COMMERCIAL

## 2025-07-01 DIAGNOSIS — N97.9 FEMALE INFERTILITY, UNSPECIFIED: ICD-10-CM

## 2025-07-01 DIAGNOSIS — N97.9 FEMALE INFERTILITY: ICD-10-CM

## 2025-07-01 LAB — ESTRADIOL SERPL-MCNC: 176 PG/ML

## 2025-07-01 PROCEDURE — 76857 US EXAM PELVIC LIMITED: CPT

## 2025-07-01 PROCEDURE — 76857 US EXAM PELVIC LIMITED: CPT | Performed by: OBSTETRICS & GYNECOLOGY

## 2025-07-01 PROCEDURE — 82670 ASSAY OF TOTAL ESTRADIOL: CPT

## 2025-07-01 NOTE — PROGRESS NOTES
CYCLING NOTE    Here for US and/or lab monitoring; relevant findings reviewed.    Cycle #: IVF Cycle #1   Reason For Treatment: Unexplained Infertility   Protocol: Antagonist  menopur 150  Trigger plan: HCG vs Lupron  Day of stim: 4  Patient Hx: 39 year old patient of Dr. Hollingsworth. AMH- 1.17.   PGT: Yes, PGT-A  Notes: Partner needs STD testing!    Patient did not stay for discussion after monitoring,  Team will contact patient later today with results and plan.    Edilberto Lacy  07/01/2025  7:09 AM      ===  Patient's partner unable to get STD labs drawn. Will plan to use frozen sperm.     Cathleen Payton 07/01/25 1:52 PM    Attempted to call patient with plan. Patient states she is unable to speak right now. Patient instructed this RN will send plan via my chart and she can call office back to schedule/with any questions/concerns. Patient agreeable. Message sent to FD for scheduling.   EDILBERTO LACY on 7/1/25 at 2:03 PM.

## 2025-07-02 DIAGNOSIS — N97.9 FEMALE INFERTILITY: ICD-10-CM

## 2025-07-03 ENCOUNTER — ANCILLARY PROCEDURE (OUTPATIENT)
Dept: ENDOCRINOLOGY | Facility: CLINIC | Age: 40
End: 2025-07-03
Payer: COMMERCIAL

## 2025-07-03 ENCOUNTER — LAB REQUISITION (OUTPATIENT)
Dept: LAB | Facility: HOSPITAL | Age: 40
End: 2025-07-03
Payer: COMMERCIAL

## 2025-07-03 DIAGNOSIS — N97.9 FEMALE INFERTILITY: ICD-10-CM

## 2025-07-03 DIAGNOSIS — N97.9 FEMALE INFERTILITY, UNSPECIFIED: ICD-10-CM

## 2025-07-03 LAB — ESTRADIOL SERPL-MCNC: 293 PG/ML

## 2025-07-03 PROCEDURE — 82670 ASSAY OF TOTAL ESTRADIOL: CPT

## 2025-07-03 PROCEDURE — 76857 US EXAM PELVIC LIMITED: CPT

## 2025-07-03 NOTE — PROGRESS NOTES
CYCLING NOTE - IVF  Cycle #: 1  Reason For Treatment: Unexplained Infertility   Protocol: Antagonist  menopur 150   Trigger plan: HCG vs Lupron   Day of stim: 6  Patient Hx: 39 year old, patient of Dr. Hollingsworth. AMH = 1.17  Notes: Planning PGT-A      Here for US and/or lab monitoring; relevant findings reviewed.    Patient stayed for nurse visit. Pain is 0/10. Patient confirmed she has enough medication to get through the holiday weekend.   Team will contact patient later today with results and plan.    Patrica Potts  07/03/2025  6:35 AM      Called patient and LVMwith plan. Patient told to start Ganirelix today, and continue same doses of other meds and will return on 7/5 for repeat scan and labs.   Patrica Potts 07/03/25 2:16 PM

## 2025-07-04 DIAGNOSIS — N97.9 FEMALE INFERTILITY: ICD-10-CM

## 2025-07-05 ENCOUNTER — LAB REQUISITION (OUTPATIENT)
Dept: LAB | Facility: HOSPITAL | Age: 40
End: 2025-07-05

## 2025-07-05 ENCOUNTER — ANCILLARY PROCEDURE (OUTPATIENT)
Dept: ENDOCRINOLOGY | Facility: CLINIC | Age: 40
End: 2025-07-05
Payer: COMMERCIAL

## 2025-07-05 DIAGNOSIS — N97.9 FEMALE INFERTILITY: ICD-10-CM

## 2025-07-05 DIAGNOSIS — N97.9 FEMALE INFERTILITY, UNSPECIFIED: ICD-10-CM

## 2025-07-05 LAB — ESTRADIOL SERPL-MCNC: 615 PG/ML

## 2025-07-05 PROCEDURE — 82670 ASSAY OF TOTAL ESTRADIOL: CPT

## 2025-07-05 PROCEDURE — 76857 US EXAM PELVIC LIMITED: CPT

## 2025-07-05 PROCEDURE — 76857 US EXAM PELVIC LIMITED: CPT | Performed by: OBSTETRICS & GYNECOLOGY

## 2025-07-05 NOTE — PROGRESS NOTES
CYCLING NOTE    Here for US and/or lab monitoring; relevant findings reviewed. 39 year old patient of Dr. Hollingsworth with AMH 1.17 is here to monitor for IVF #1 after 7 days of injections.     Treatment protocol: Antagonist  menopur 150    Patient stayed for nurse visit. Pain is 0/10. Tentatively scheduled patient for Monday. Reviewed with patient that we cannot control when she will be ready for trigger, however have a retrieval middle to late next week would be reasonable to have in mind for potential timing. Also reviewed with patient that she will not be able to work the day of her retrieval due to anesthesia. Patient did inform this RN she plans to travel Saturday 7/12 provided she is done with her egg retrieval.    Team will contact patient later today with results and plan.    EARL CASTANO  07/05/2025  9:09 AM      [employer] message sent to patient relaying plan by Dr. Chew.   EARL CASTANO RN 07/05/2025 11:59

## 2025-07-06 DIAGNOSIS — N97.9 FEMALE INFERTILITY: ICD-10-CM

## 2025-07-07 ENCOUNTER — SPECIALTY PHARMACY (OUTPATIENT)
Dept: PHARMACY | Facility: CLINIC | Age: 40
End: 2025-07-07

## 2025-07-07 ENCOUNTER — LAB REQUISITION (OUTPATIENT)
Dept: LAB | Facility: HOSPITAL | Age: 40
End: 2025-07-07
Payer: COMMERCIAL

## 2025-07-07 ENCOUNTER — PHARMACY VISIT (OUTPATIENT)
Dept: PHARMACY | Facility: CLINIC | Age: 40
End: 2025-07-07
Payer: COMMERCIAL

## 2025-07-07 ENCOUNTER — ANCILLARY PROCEDURE (OUTPATIENT)
Dept: ENDOCRINOLOGY | Facility: CLINIC | Age: 40
End: 2025-07-07
Payer: COMMERCIAL

## 2025-07-07 DIAGNOSIS — N97.9 FEMALE INFERTILITY: ICD-10-CM

## 2025-07-07 DIAGNOSIS — N97.9 FEMALE INFERTILITY, UNSPECIFIED: ICD-10-CM

## 2025-07-07 LAB
ESTRADIOL SERPL-MCNC: 873 PG/ML
PROGEST SERPL-MCNC: 1.4 NG/ML

## 2025-07-07 PROCEDURE — 76857 US EXAM PELVIC LIMITED: CPT

## 2025-07-07 PROCEDURE — RXMED WILLOW AMBULATORY MEDICATION CHARGE

## 2025-07-07 PROCEDURE — 84144 ASSAY OF PROGESTERONE: CPT

## 2025-07-07 PROCEDURE — 82670 ASSAY OF TOTAL ESTRADIOL: CPT

## 2025-07-07 PROCEDURE — 76857 US EXAM PELVIC LIMITED: CPT | Performed by: OBSTETRICS & GYNECOLOGY

## 2025-07-07 NOTE — PROGRESS NOTES
CYCLING NOTE    Here for US and/or lab monitoring; relevant findings reviewed.  Cycle #: 1  Reason For Treatment: Unexplained Infertility   Protocol: Antagonist  menopur 150   Trigger plan: HCG vs Lupron   Day of stim: 10  Patient Hx: 39 year old, patient of Dr. Hollingsworth. AMH = 1.17  Notes: Planning PGT-A    Patient did not stay for discussion after monitoring,  Team will contact patient later today with results and plan.    Diana Jimenez  07/07/2025  8:06 AM    Patient to continue all medications at current doses and return to office tomorrow for scan and labs.   [employer] message sent with plan.    Diana Jimenez 07/07/25 1:13 PM

## 2025-07-08 ENCOUNTER — ANCILLARY PROCEDURE (OUTPATIENT)
Dept: ENDOCRINOLOGY | Facility: CLINIC | Age: 40
End: 2025-07-08
Payer: COMMERCIAL

## 2025-07-08 ENCOUNTER — LAB REQUISITION (OUTPATIENT)
Dept: LAB | Facility: HOSPITAL | Age: 40
End: 2025-07-08
Payer: COMMERCIAL

## 2025-07-08 DIAGNOSIS — N97.9 FEMALE INFERTILITY, UNSPECIFIED: ICD-10-CM

## 2025-07-08 DIAGNOSIS — N97.9 FEMALE INFERTILITY: ICD-10-CM

## 2025-07-08 LAB
ESTRADIOL SERPL-MCNC: 1202 PG/ML
PROGEST SERPL-MCNC: 1.3 NG/ML

## 2025-07-08 PROCEDURE — 82670 ASSAY OF TOTAL ESTRADIOL: CPT

## 2025-07-08 PROCEDURE — 76857 US EXAM PELVIC LIMITED: CPT | Performed by: OBSTETRICS & GYNECOLOGY

## 2025-07-08 PROCEDURE — 76857 US EXAM PELVIC LIMITED: CPT

## 2025-07-08 PROCEDURE — 84144 ASSAY OF PROGESTERONE: CPT

## 2025-07-08 NOTE — PROGRESS NOTES
CYCLING NOTE    Here for US and/or lab monitoring; relevant findings reviewed.  Cycle #: 1  Reason For Treatment: Unexplained Infertility   Protocol: Antagonist  menopur 150   Trigger plan: HCG vs Lupron   Day of stim: 11  Patient Hx: 39 year old, patient of Dr. Hollingsworth. AMH = 1.17  Notes: Planning PGT-A      Patient did not stay for discussion after monitoring,  Team will contact patient later today with results and plan.    Diana Jimenez  07/08/2025  9:16 AM      IVF HCG Trigger    Called patient, no answer, left detailed VM and sent [employer] message.  Patient to trigger tonight at exactly  9:15pm with 10,000 units of HCG, no other IVF meds.      Pre-op, post op and trigger instruction hand outs given and reviewed.   Plan UPT morning after trigger; Patient  to call the office ONLY if negative result.  NPO after midnight night before procedure; no perfumes, deodorant or lotions.  Arrive 60 minutes prior to procedure at [facility].  Patient verbalizes understanding of plan and location of procedure.    Diana Jimenez RN  07/08/2025  1:31 PM

## 2025-07-09 ENCOUNTER — PREP FOR PROCEDURE (OUTPATIENT)
Dept: ENDOCRINOLOGY | Facility: CLINIC | Age: 40
End: 2025-07-09
Payer: COMMERCIAL

## 2025-07-09 ENCOUNTER — SPECIALTY PHARMACY (OUTPATIENT)
Dept: PHARMACY | Facility: CLINIC | Age: 40
End: 2025-07-09

## 2025-07-09 ENCOUNTER — ANESTHESIA EVENT (OUTPATIENT)
Dept: ENDOCRINOLOGY | Facility: CLINIC | Age: 40
End: 2025-07-09
Payer: COMMERCIAL

## 2025-07-09 RX ORDER — CEFAZOLIN SODIUM 2 G/100ML
2 INJECTION, SOLUTION INTRAVENOUS ONCE
Status: CANCELLED | OUTPATIENT
Start: 2025-07-09 | End: 2025-07-09

## 2025-07-09 RX ORDER — KETOROLAC TROMETHAMINE 30 MG/ML
30 INJECTION, SOLUTION INTRAMUSCULAR; INTRAVENOUS ONCE
Status: CANCELLED | OUTPATIENT
Start: 2025-07-09 | End: 2025-07-09

## 2025-07-09 RX ORDER — KETOROLAC TROMETHAMINE 30 MG/ML
30 INJECTION, SOLUTION INTRAMUSCULAR; INTRAVENOUS ONCE AS NEEDED
Status: CANCELLED | OUTPATIENT
Start: 2025-07-09 | End: 2025-07-14

## 2025-07-09 RX ORDER — ACETAMINOPHEN 325 MG/1
650 TABLET ORAL ONCE AS NEEDED
Status: CANCELLED | OUTPATIENT
Start: 2025-07-09

## 2025-07-09 RX ORDER — ONDANSETRON HYDROCHLORIDE 2 MG/ML
4 INJECTION, SOLUTION INTRAVENOUS AS NEEDED
Status: CANCELLED | OUTPATIENT
Start: 2025-07-09

## 2025-07-09 RX ORDER — HYDROCODONE BITARTRATE AND ACETAMINOPHEN 5; 325 MG/1; MG/1
1 TABLET ORAL ONCE AS NEEDED
Refills: 0 | Status: CANCELLED | OUTPATIENT
Start: 2025-07-09

## 2025-07-09 RX ORDER — MORPHINE SULFATE 2 MG/ML
2 INJECTION, SOLUTION INTRAMUSCULAR; INTRAVENOUS AS NEEDED
Refills: 0 | Status: CANCELLED | OUTPATIENT
Start: 2025-07-09

## 2025-07-09 RX ORDER — OXYCODONE AND ACETAMINOPHEN 5; 325 MG/1; MG/1
1 TABLET ORAL EVERY 6 HOURS PRN
Refills: 0 | Status: CANCELLED | OUTPATIENT
Start: 2025-07-09

## 2025-07-09 NOTE — PROGRESS NOTES
[facility] Clinical Note  Patient Reassessment- Fertility End of Therapy Note     Introduction  Zoe Wong is a 39 y.o. female who is on the specialty pharmacy service for management of: Fertility Core.      [facility] supplied medication:   Follistim 900 IU cartridge, Menopur 75 unit vials, Ganirelix 250mcg syringes, Pregnyl 10,000IU vial for trigger , and Leuprolide 1mg/0.2mL kit for trigger    Duration of therapy: 11 days of therapy     Treatment Start Date: 6/28/25  Treatment End Date: 7/8/25      IVF Meeting/Reassessment Tracking:     Initial Education Completed: Yes - patient wanted all written education, sent.  New IVF Cycle- cycle 1, antagonist protocol, start medications 6/28/25     IVF Baseline Starting Doses:   Follistim 300 units subcutaneous daily  Menopur 150 units subcutaneous daily  Monitoring again 7/1/25     IVF Meeting 7/1/25:  Continue Follistim 300 units subcutaneous daily  Continue Menopur 150 units subcutaneous daily  Monitoring again 7/3/25     IVF Meeting 7/3/25:  Continue Follistim 300 units subcutaneous daily  Continue Menopur 150 units subcutaneous daily  Start Ganirelix 250mcg subcutaneous daily on 7/3  Monitoring again 7/5/25     IVF Meeting 7/5/25:  Continue Follistim 300 units subcutaneous daily  Continue Menopur 150 units subcutaneous daily  Continue Ganirelix 250mcg subcutaneous daily   Monitoring again 7/7/25     IVF Meeting 7/7/25:  Continue Follistim 300 units subcutaneous daily  Continue Menopur 150 units subcutaneous daily  Continue Ganirelix 250mcg subcutaneous daily   Monitoring again 7/8/25     IVF Meeting 7/8/25  Trigger tonight with HCG 10,000 units for egg retrieval 7/10/25    The most recent care plan/IVF meeting note with the doc of the day on 7/8/25 was reviewed.  Pharmacy will continue to collaborate in the care of this patient with the referring prescriber.    Discussion  Zoe was contacted on 7/9/2025 at 6:42 PM for a pharmacy visit with  encounter number [id number] from:   [facility]   SPECIAL[facility]  [address] [address]  Dept: [phone]  Dept Fax: [phone]  Zoe consented to a/an Telephone visit, which was performed.    Efficacy  Patient has developed new symptoms of condition: Cabergoline added in to decrease OHSS risk: No  Patient/caregiver feels medication is affecting the disease state: Egg Retrieval scheduled for 7/10/25    Goals  Provided education on goals and possible outcomes of therapy:    Successful administration of IVF Medication(s)  Stimulate ovaries to produce multiple eggs for retrieval (Gonal-F, Follistim, Menopur, Low Dose HCG)  Suppress ovaries to prevent premature ovulation (Ganirelix/Cetrotide, Microdose & Long Lupron)  Trigger ovulation to prepare for egg retrieval (Lupron/Pregnyl)    All goals achieved through medication therapy.    Tolerance  Patient has experienced side effects from this medication: Yes - headache, bloating, injection site reactions from ganirelix specifically  Changes to current therapy regimen: No- no dose changes to medications during IVF stimulation cycle.    The follow-up timeline was discussed. Every person responds to and reacts to therapy differently. Patient should be assessed for efficacy and tolerability in approximately: N/a, patient completed medication therapy.    Adherence  Patient Information  Informant: Self (Patient)  Demonstrates Understanding of Importance of Adherence: Yes  Does the patient have any barriers to self-administration (including physical and mental?): No  Barriers to Self-Administration: no barriers  Action Taken to Mitigate Barriers for Self-Administration: none taken  Support Network for Adherence: Healthcare Provider  Adherence Tools Used: Calendar  Medication Information  Medication:  (Follistim, Menopur, Ganirelix, HCG Trigger, Lupron Trigger)  Patient Reported Missed Doses in the Last 4 Weeks: 0  Estimated Medication  Adherence Level: Good  Adherence Estimation Source: Provider (IVF Meeting Monitoring)  Barriers to Adherence: No Problems identified  Action Taken to Address Barriers to Adherence: none needed  What concerns do you have regarding your medications?: no concerns   The importance of adherence was discussed and patient/caregiver was advised to take the medication as prescribed by their provider. Encouraged patient/caregiver to call physician's office or specialty pharmacy if they have a question regarding a missed dose.    General Assessment  Changes to home medications, OTCs or supplements: No Note: IVF stimulation medications completed at this time.   Current Medications[1]  Reported new allergies: No  Reported new medical conditions: No  Additional monitoring reviewed: N/a  Is laboratory follow up needed? No    Do you have any remaining fertility medications? Yes - reviewed proper storage of extra medications    Advised to contact the pharmacy if there are any changes to the patient's medication list, including prescriptions, OTC medications, herbal products, or supplements.    Impression/Plan  This patient has not been identified as high risk.   The following action was taken: N/A.    QOL/Patient Satisfaction  Rate your quality of life on scale of 1-10:  (number not given, but stated she is doing better than at the start of the cycle)  Rate your satisfaction with  Specialty Pharmacy on scale of 1-10: 10 - Completely satisfied    Provided contact information ([phone]) for [facility] and reviewed dispensing process, refill timeline and patient management follow up. Confirmed understanding of education conducted during assessment. All questions and concerns were addressed and patient/caregiver was encouraged to reach out for additional questions or concerns.    Based on the patient's diagnosis, medication list, progress towards goals, adherence, tolerance, and medication list,  medication therapy is completed and pharmacist monitoring completed: Therapy remains appropriate (I attest)      Jackeline Tilley (Katie), PharmD  [facility]  Clinical Pharmacy Specialist- Fertility   [facility]  [address] [place] [address]  Email: [email or web address]  Tel: [phone]       Fax: [phone]           [1]   Current Outpatient Medications   Medication Sig Dispense Refill    chorionic gonadotropin (Pregnyl) 10,000 unit injection Reconstitute according to instructions and inject 10,000 units (1 mL) under the skin as a one time dose, as directed per provider for trigger. 1 each 0    follitropin beta (Follistim AQ) 900 unit/1.08 mL injection Inject 300 Units under the skin once daily in the evening. Inject under the skin as directed by provider 3 each 2    ganirelix (Orgalutran) 250 mcg/0.5 mL syringe injection Inject 0.5 mL (250 mcg) under the skin once daily in the morning. Inject 250 mcg (1 syringe) under the skin once daily as directed per provider. 5 each 2    leuprolide (Lupron) 1 mg/0.2 mL injection Using an insulin syringe, draw up 80 units and inject under the skin as a one time dose, as directed per provider for trigger. 1 kit 0    loratadine (CLARITIN ORAL) Take 1 tablet by mouth once daily as needed.      menotropins (Menopur) 75 unit recon soln injection Reconstitute and inject 150 Units under the skin once daily in the evening. Inject under the skin as directed by provider 20 each 2    norgestimate-ethinyl estradiol (Ortho-Cyclen) 0.25-0.035 mg tablet Take 1 tablet by mouth once daily. Take active pills only as directed per provider 28 tablet 2    pen injector, for follitropin, pen injector Inject 1 Pen under the skin once daily. Use as directed to administer Follistrim 1 each 0    propranolol (Inderal) 20 mg tablet TAKE 1 TABLET BY MOUTH EVERY DAY 90 tablet 1    ZOLMitriptan (Zomig) 5 mg tablet Take 1  tablet (5 mg) by mouth 1 time if needed.       No current facility-administered medications for this visit.

## 2025-07-10 ENCOUNTER — HOSPITAL ENCOUNTER (OUTPATIENT)
Dept: ENDOCRINOLOGY | Facility: CLINIC | Age: 40
Discharge: HOME | End: 2025-07-10
Payer: COMMERCIAL

## 2025-07-10 ENCOUNTER — ANESTHESIA (OUTPATIENT)
Dept: ENDOCRINOLOGY | Facility: CLINIC | Age: 40
End: 2025-07-10
Payer: COMMERCIAL

## 2025-07-10 VITALS
WEIGHT: 134.48 LBS | HEART RATE: 62 BPM | DIASTOLIC BLOOD PRESSURE: 71 MMHG | RESPIRATION RATE: 16 BRPM | OXYGEN SATURATION: 100 % | TEMPERATURE: 97 F | SYSTOLIC BLOOD PRESSURE: 92 MMHG | BODY MASS INDEX: 22.96 KG/M2 | HEIGHT: 64 IN

## 2025-07-10 DIAGNOSIS — Z31.83 ENCOUNTER FOR ASSISTED REPRODUCTIVE FERTILITY CYCLE: ICD-10-CM

## 2025-07-10 LAB — PREGNANCY TEST URINE, POC: POSITIVE

## 2025-07-10 PROCEDURE — 2500000004 HC RX 250 GENERAL PHARMACY W/ HCPCS (ALT 636 FOR OP/ED): Performed by: ANESTHESIOLOGIST ASSISTANT

## 2025-07-10 PROCEDURE — 89280 ASSIST OOCYTE FERTILIZATION: CPT | Performed by: OBSTETRICS & GYNECOLOGY

## 2025-07-10 PROCEDURE — 58970 RETRIEVAL OF OOCYTE: CPT | Performed by: OBSTETRICS & GYNECOLOGY

## 2025-07-10 PROCEDURE — 76948 ECHO GUIDE OVA ASPIRATION: CPT | Performed by: OBSTETRICS & GYNECOLOGY

## 2025-07-10 PROCEDURE — 89258 CRYOPRESERVATION EMBRYO(S): CPT | Performed by: OBSTETRICS & GYNECOLOGY

## 2025-07-10 PROCEDURE — A58970 PR FOLLICLE PUNC,RETRIEVAL OF OOCYTE: Performed by: ANESTHESIOLOGY

## 2025-07-10 PROCEDURE — 89250 CULTR OOCYTE/EMBRYO <4 DAYS: CPT | Performed by: OBSTETRICS & GYNECOLOGY

## 2025-07-10 PROCEDURE — A58970 PR FOLLICLE PUNC,RETRIEVAL OF OOCYTE: Performed by: ANESTHESIOLOGIST ASSISTANT

## 2025-07-10 PROCEDURE — 89290 BIOPSY OOCYTE POLAR BODY <=5: CPT | Performed by: OBSTETRICS & GYNECOLOGY

## 2025-07-10 PROCEDURE — 3700000002 HC GENERAL ANESTHESIA TIME - EACH INCREMENTAL 1 MINUTE

## 2025-07-10 PROCEDURE — 89272 EXTENDED CULTURE OF OOCYTES: CPT | Performed by: OBSTETRICS & GYNECOLOGY

## 2025-07-10 PROCEDURE — 3700000001 HC GENERAL ANESTHESIA TIME - INITIAL BASE CHARGE

## 2025-07-10 PROCEDURE — 89260 SPERM ISOLATION SIMPLE: CPT | Performed by: OBSTETRICS & GYNECOLOGY

## 2025-07-10 PROCEDURE — 7100000010 HC PHASE TWO TIME - EACH INCREMENTAL 1 MINUTE

## 2025-07-10 PROCEDURE — 7100000009 HC PHASE TWO TIME - INITIAL BASE CHARGE

## 2025-07-10 RX ORDER — HYDROCODONE BITARTRATE AND ACETAMINOPHEN 5; 325 MG/1; MG/1
1 TABLET ORAL ONCE AS NEEDED
Status: DISCONTINUED | OUTPATIENT
Start: 2025-07-10 | End: 2025-07-11 | Stop reason: HOSPADM

## 2025-07-10 RX ORDER — ACETAMINOPHEN 325 MG/1
650 TABLET ORAL ONCE AS NEEDED
Status: DISCONTINUED | OUTPATIENT
Start: 2025-07-10 | End: 2025-07-11 | Stop reason: HOSPADM

## 2025-07-10 RX ORDER — ONDANSETRON HYDROCHLORIDE 2 MG/ML
4 INJECTION, SOLUTION INTRAVENOUS AS NEEDED
Status: DISCONTINUED | OUTPATIENT
Start: 2025-07-10 | End: 2025-07-11 | Stop reason: HOSPADM

## 2025-07-10 RX ORDER — MIDAZOLAM HYDROCHLORIDE 1 MG/ML
INJECTION, SOLUTION INTRAMUSCULAR; INTRAVENOUS AS NEEDED
Status: DISCONTINUED | OUTPATIENT
Start: 2025-07-10 | End: 2025-07-10

## 2025-07-10 RX ORDER — CEFAZOLIN 1 G/1
INJECTION, POWDER, FOR SOLUTION INTRAVENOUS AS NEEDED
Status: DISCONTINUED | OUTPATIENT
Start: 2025-07-10 | End: 2025-07-10

## 2025-07-10 RX ORDER — KETOROLAC TROMETHAMINE 30 MG/ML
30 VIAL (ML) INJECTION ONCE
Status: DISCONTINUED | OUTPATIENT
Start: 2025-07-10 | End: 2025-07-11 | Stop reason: HOSPADM

## 2025-07-10 RX ORDER — KETOROLAC TROMETHAMINE 30 MG/ML
30 INJECTION, SOLUTION INTRAMUSCULAR; INTRAVENOUS ONCE AS NEEDED
Status: DISCONTINUED | OUTPATIENT
Start: 2025-07-10 | End: 2025-07-11 | Stop reason: HOSPADM

## 2025-07-10 RX ORDER — CEFAZOLIN SODIUM 2 G/50ML
2 SOLUTION INTRAVENOUS ONCE
Status: DISCONTINUED | OUTPATIENT
Start: 2025-07-10 | End: 2025-07-11 | Stop reason: HOSPADM

## 2025-07-10 RX ORDER — LIDOCAINE HYDROCHLORIDE 20 MG/ML
INJECTION, SOLUTION INFILTRATION; PERINEURAL AS NEEDED
Status: DISCONTINUED | OUTPATIENT
Start: 2025-07-10 | End: 2025-07-10

## 2025-07-10 RX ORDER — PROPOFOL 10 MG/ML
INJECTION, EMULSION INTRAVENOUS AS NEEDED
Status: DISCONTINUED | OUTPATIENT
Start: 2025-07-10 | End: 2025-07-10

## 2025-07-10 RX ORDER — FENTANYL CITRATE 50 UG/ML
INJECTION, SOLUTION INTRAMUSCULAR; INTRAVENOUS AS NEEDED
Status: DISCONTINUED | OUTPATIENT
Start: 2025-07-10 | End: 2025-07-10

## 2025-07-10 RX ORDER — MORPHINE SULFATE 2 MG/ML
2 INJECTION, SOLUTION INTRAMUSCULAR; INTRAVENOUS AS NEEDED
Status: DISCONTINUED | OUTPATIENT
Start: 2025-07-10 | End: 2025-07-11 | Stop reason: HOSPADM

## 2025-07-10 RX ORDER — KETOROLAC TROMETHAMINE 30 MG/ML
INJECTION, SOLUTION INTRAMUSCULAR; INTRAVENOUS AS NEEDED
Status: DISCONTINUED | OUTPATIENT
Start: 2025-07-10 | End: 2025-07-10

## 2025-07-10 RX ORDER — OXYCODONE AND ACETAMINOPHEN 5; 325 MG/1; MG/1
1 TABLET ORAL EVERY 6 HOURS PRN
Status: DISCONTINUED | OUTPATIENT
Start: 2025-07-10 | End: 2025-07-11 | Stop reason: HOSPADM

## 2025-07-10 RX ORDER — ONDANSETRON HYDROCHLORIDE 2 MG/ML
INJECTION, SOLUTION INTRAVENOUS AS NEEDED
Status: DISCONTINUED | OUTPATIENT
Start: 2025-07-10 | End: 2025-07-10

## 2025-07-10 RX ADMIN — PROPOFOL 50 MG: 10 INJECTION, EMULSION INTRAVENOUS at 09:24

## 2025-07-10 RX ADMIN — MIDAZOLAM 2 MG: 1 INJECTION INTRAMUSCULAR; INTRAVENOUS at 09:22

## 2025-07-10 RX ADMIN — DEXAMETHASONE SODIUM PHOSPHATE 4 MG: 4 INJECTION, SOLUTION INTRA-ARTICULAR; INTRALESIONAL; INTRAMUSCULAR; INTRAVENOUS; SOFT TISSUE at 09:26

## 2025-07-10 RX ADMIN — KETOROLAC TROMETHAMINE 30 MG: 30 INJECTION, SOLUTION INTRAMUSCULAR; INTRAVENOUS at 09:40

## 2025-07-10 RX ADMIN — CEFAZOLIN 2 G: 330 INJECTION, POWDER, FOR SOLUTION INTRAMUSCULAR; INTRAVENOUS at 09:26

## 2025-07-10 RX ADMIN — LIDOCAINE HYDROCHLORIDE 50 MG: 20 INJECTION, SOLUTION INFILTRATION; PERINEURAL at 09:24

## 2025-07-10 RX ADMIN — SODIUM CHLORIDE, POTASSIUM CHLORIDE, SODIUM LACTATE AND CALCIUM CHLORIDE: 600; 310; 30; 20 INJECTION, SOLUTION INTRAVENOUS at 09:21

## 2025-07-10 RX ADMIN — PROPOFOL 200 MCG/KG/MIN: 10 INJECTION, EMULSION INTRAVENOUS at 09:25

## 2025-07-10 RX ADMIN — FENTANYL CITRATE 50 MCG: 50 INJECTION, SOLUTION INTRAMUSCULAR; INTRAVENOUS at 09:24

## 2025-07-10 RX ADMIN — ONDANSETRON 4 MG: 2 INJECTION INTRAMUSCULAR; INTRAVENOUS at 09:29

## 2025-07-10 ASSESSMENT — COLUMBIA-SUICIDE SEVERITY RATING SCALE - C-SSRS
1. IN THE PAST MONTH, HAVE YOU WISHED YOU WERE DEAD OR WISHED YOU COULD GO TO SLEEP AND NOT WAKE UP?: NO
6. HAVE YOU EVER DONE ANYTHING, STARTED TO DO ANYTHING, OR PREPARED TO DO ANYTHING TO END YOUR LIFE?: NO
2. HAVE YOU ACTUALLY HAD ANY THOUGHTS OF KILLING YOURSELF?: NO

## 2025-07-10 ASSESSMENT — PAIN SCALES - GENERAL
PAINLEVEL_OUTOF10: 0 - NO PAIN

## 2025-07-10 ASSESSMENT — PAIN - FUNCTIONAL ASSESSMENT
PAIN_FUNCTIONAL_ASSESSMENT: 0-10

## 2025-07-10 NOTE — ANESTHESIA PREPROCEDURE EVALUATION
Patient: Zoe Wong    Procedure Information       Date/Time: 07/10/25 0933    Scheduled providers: Zamzam Espinosa MD; Devon Chambers MD; MORGAN Barth    Procedure: EGG RETRIEVAL    [facility]; [facility]            Relevant Problems   Neuro   (+) Anxiety with flying      GI   (+) Gastroesophageal reflux disease   (+) Irritable bowel syndrome with constipation      HEENT   (+) Seasonal allergies       Clinical information reviewed:   Tobacco  Allergies  Meds   Med Hx  Surg Hx   Fam Hx  Soc Hx         Medical History[1]   Surgical History[2]  Social History[3]   Current Outpatient Medications   Medication Instructions    chorionic gonadotropin (Pregnyl) 10,000 unit injection Reconstitute according to instructions and inject 10,000 units (1 mL) under the skin as a one time dose, as directed per provider for trigger.    Follistim  Units, subcutaneous, Every evening, Inject under the skin as directed by provider    ganirelix (ORGALUTRAN) 250 mcg, subcutaneous, Every morning, Inject 250 mcg (1 syringe) under the skin once daily as directed per provider.    leuprolide (Lupron) 1 mg/0.2 mL injection Using an insulin syringe, draw up 80 units and inject under the skin as a one time dose, as directed per provider for trigger.    loratadine (CLARITIN ORAL) 1 tablet, Daily PRN    menotropins (Menopur) 75 unit recon soln injection Reconstitute and inject 150 Units under the skin once daily in the evening. Inject under the skin as directed by provider    norgestimate-ethinyl estradiol (Ortho-Cyclen) 0.25-0.035 mg tablet 1 tablet, oral, Daily, Take active pills only as directed per provider    pen injector, for follitropin, pen injector 1 Pen, subcutaneous, Daily, Use as directed to administer Follistrim    propranolol (INDERAL) 20 mg, oral, Daily    ZOLMitriptan (ZOMIG) 5 mg, Once as needed      RX Allergies[4]     Chemistry    Lab Results   Component Value  "Date/Time     03/15/2023 1158    K 4.2 03/15/2023 1158     03/15/2023 1158    CO2 29 03/15/2023 1158    BUN 12 03/15/2023 1158    CREATININE 0.63 03/15/2023 1158    Lab Results   Component Value Date/Time    CALCIUM 9.0 03/15/2023 1158    ALKPHOS 50 03/15/2023 1158    AST 17 03/15/2023 1158    ALT 14 03/15/2023 1158    BILITOT 0.4 03/15/2023 1158          No results found for: \"HGBA1C\"  Lab Results   Component Value Date/Time    WBC 7.6 06/25/2025 0646    HGB 12.5 06/25/2025 0646    HCT 39.4 06/25/2025 0646     06/25/2025 0646    ABO O 03/25/2025 1128    LABRH NEG 03/25/2025 1128    ABSCRN POS 03/25/2025 1128     No results found for: \"PROTIME\", \"PTT\", \"INR\"  No results found for this or any previous visit (from the past 4464 hours).   No results found for this or any previous visit from the past 1095 days.        Visit Vitals  /68   Pulse 77   Temp 35.9 °C (96.6 °F) (Temporal)   Resp 16   Ht 1.626 m (5' 4\")   Wt 61 kg (134 lb 7.7 oz)   SpO2 100%   BMI 23.08 kg/m²   OB Status Having periods   Smoking Status Never   BSA 1.66 m²     NPO/Void Status  Date of Last Liquid: 07/09/25  Time of Last Liquid: 2100  Date of Last Solid: 07/09/25  Time of Last Solid: 2100        Physical Exam    Airway  Mallampati: II  TM distance: >3 FB  Neck ROM: full  Mouth opening: 3 or more finger widths     Cardiovascular - normal exam  Rhythm: regular  Rate: normal     Dental    Pulmonary - normal exam   Abdominal - normal exam           Anesthesia Plan    History of general anesthesia?: yes  History of complications of general anesthesia?: no    ASA 2     MAC   (Standard ASA monitoring. Discussed possibility of transient awareness and recall with the patient.)  intravenous induction   Anesthetic plan and risks discussed with patient.    Plan discussed with CRNA and CAA.             [1]   Past Medical History:  Diagnosis Date    Anxiety     GERD (gastroesophageal reflux disease)     HPV (human papilloma virus) " infection     Insomnia     Migraines     Personal history of urinary (tract) infections     Seasonal allergies    [2]   Past Surgical History:  Procedure Laterality Date    ADENOIDECTOMY      APPENDECTOMY      BREAST BIOPSY      DILATION AND CURETTAGE OF UTERUS      HEMORRHOID SURGERY      OTHER SURGICAL HISTORY N/A 07/10/2025    Egg Retrieval   [3]   Social History  Tobacco Use    Smoking status: Never    Smokeless tobacco: Never   Vaping Use    Vaping status: Never Used   Substance Use Topics    Alcohol use: Yes     Alcohol/week: 2.0 standard drinks of alcohol     Types: 2 Glasses of wine per week     Comment: social    Drug use: Never   [4]   Allergies  Allergen Reactions    Bee Pollen Unknown

## 2025-07-10 NOTE — DISCHARGE INSTRUCTIONS
[facility]  [address]. [place]  [address]   Tel: [phone]   Fax: [phone]    Home Going Instructions after Egg Retrieval:     Activity:   We do not recommend exercise on the day of or the day after your egg retrieval.   You can resume normal activities in 2-3 days, but please avoid exercise that involves jumping or bouncing.  If you are not having a fresh embryo transfer, you will likely start your period within 1-2 weeks and can resume all normal exercise at that time.   You may resume intercourse one week after your retrieval.     Anesthesia:  Drink small amounts of liquids initially and then slowly increase your intake of food on the day of your procedure. Drinking fluids will keep your bowels regular.   Avoid foods that are sweet, spicy or hard to digest today.  If you feel nauseated, rest your stomach for one hour, and then try drinking clear liquids again.  You may take a stool softener or miralax/milk of magnesia to help with constipation that may occur after anesthesia.  Please make sure a responsible adult is with you for at least 24 hours after surgery and do not drive or make important decisions during this time. Anesthesia may affect your judgment, coordination, and reaction time.    Follow up:  ALWAYS follow up with your primary nurse a few days after your procedure to check in and make sure you know what your next steps are.     When to call your provider:  Vaginal bleeding that is more than a normal period that doesn't taper down within 6 hours.  Saturating a sanitary pad in 1-2 hours.  Any clots the size of an egg or bigger.  Fever of 100.4 or higher with chills.  Unusual or foul smelling discharge.  Discomfort from abdominal distension.     Notify your Physician's office if you develop any signs of Ovarian Hyperstimulation (OHSS):    -Abdominal bloating   -Rapid weight gain of 5-10 lbs. in 1-2 days  -Swelling in  hands and feet  -Severe abdominal pain  -Shortness of breath   -Difficulty urinating or decreased urinary output   -Diarrhea    -Persistent nausea and vomiting  -Dizziness     These signs and symptoms can occur for up to 2 weeks following your oocyte retrieval. Call [phone] to speak with a Physician or Nurse if you have any concerns.      8:18 AM    [facility]  [address]. [place], [place]     IVF LAB EMBRYO UPDATE PROTOCOL    The IVF Lab will call on the following days:    Retrieval Day: The doctor performing the procedure will tell you the total number of eggs collected. There will be no update from the IVF Lab on retrieval day.   Day 1: The IVF Lab will call you between 9:00-11:00 with an egg fertilization update.    Day 6: The IVF Lab will call you for an update on how your embryos have developed and how many have reached the blastocyst stage.    Day 6/7: You will receive an email from the IVF Lab with your summary report indicating the number of embryos that were able to be frozen. If you are doing genetic testing on your embryos you will receive a phone call regarding the number of embryos biopsied and frozen.    8:18 AM    [facility]  [address]. [place]  [address]  Tel: [phone]   Fax: [phone]    Frozen Embryo Transfer Instructions    After your egg retrieval, follow up with your IVF nurse within 3-4 days Monday-Friday regarding the plan for your frozen embryo transfer (FET) cycle. Your IVF nurse will order and review with you the medications you will be using for the cycle.     You will be sent an email from [facility] to fill out your Frozen Embryo Treatment Plan at this time. This must be completed by the day you call the office with your period to set up the transfer cycle. If you do not have this paperwork completed when you call for cycle set up, you may  need to take a birth control pill, if appropriate, or wait until your following period. Completion of this paperwork is what allows us to call your embryos back from offsite storage for embryo transfer.    Please call the office on the first full flow day of your period to speak with your IVF nurse.    If the first day of your cycle begins over the weekend, please call the office Monday morning.   Depending on our embryo transfer schedule and the medication protocol your doctor prescribes, you may not start your embryo transfer cycle medications immediately after your period starts. If appropriate for you, your doctor may have you start birth control to help time your embryo transfer cycle which means there may be a short delay in starting your FET cycle.   If you did PGT testing of your embryos, you will not start a frozen embryo transfer cycle until we have received your results. It can take up to a few weeks to receive these results. If appropriate, we may have you take birth control from the time your period starts until the results are ready.      You will be set up for a lining check ultrasound and labs mid cycle, and given a tentative embryo transfer date. You may require multiple lining checks at the discretion of your provider. After your provider determines your lining is adequate, we will determine what date you will begin your progesterone support and confirm the day of your embryo transfer.     The embryology lab will contact you the day before with the time of your embryo transfer and when to arrive.     Please make sure to drink 20oz. of water one hour before your scheduled arrival time.   You do not need to be on bedrest following your embryo transfer. You may resume normal activity following embryo transfer.   You will have your blood drawn on the day of transfer to check a progesterone level and you will receive a call that afternoon with your results.   Do not discontinue any of your medications  unless instructed to by your provider.

## 2025-07-10 NOTE — ANESTHESIA POSTPROCEDURE EVALUATION
Patient: Zoe Wong    Procedure Summary       Date: 07/10/25 [facility]    Anesthesia Start: 0921 Anesthesia Stop: 0951    Procedure: EGG RETRIEVAL Diagnosis: Encounter for assisted reproductive fertility cycle    Scheduled Providers: Zamzam Espinosa MD; Devon Chambers MD; MORGAN Barth Responsible Provider: Devon Chambers MD    Anesthesia Type: MAC ASA Status: 2            Anesthesia Type: MAC    Vitals Value Taken Time   BP 92/71 07/10/25 10:32   Temp 36.1 °C (97 °F) 07/10/25 09:49   Pulse 62 07/10/25 10:32   Resp 16 07/10/25 10:32   SpO2 100 % 07/10/25 10:32       Anesthesia Post Evaluation    Patient location during evaluation: PACU  Patient participation: complete - patient participated  Level of consciousness: awake and alert  Pain management: adequate  Airway patency: patent  Cardiovascular status: acceptable and hemodynamically stable  Respiratory status: acceptable, spontaneous ventilation and nonlabored ventilation  Hydration status: acceptable  Postoperative Nausea and Vomiting: none        There were no known notable events for this encounter.

## 2025-07-10 NOTE — PROGRESS NOTES
Patient ID: Zoe Wong is a 39 y.o. female.    Egg Retrieval    Date/Time: 7/10/2025 9:43 AM    Performed by: Zamzam Espinosa MD  Authorized by: RAZ Alvarez-CNP    Consent:     Consent obtained:  Verbal and written    Consent given by:  Patient    Procedure risks and benefits discussed: yes      Patient questions answered: yes      Patient agrees, verbalizes understanding, and wants to proceed: yes      Educational handouts given: yes      Instructions and paperwork completed: yes    Procedure:     Anesthesia:  MAC    Pelvic exam performed: Yes      Cervix cleaned and prepped: Yes      Speculum placed in vagina: Yes      Tenaculum applied to cervix: No      Ultrasound guidance: Yes      Left ovary:  Follicle present    Right ovary:  Follicle present    Pt. post-ovulatory: No      Speculum type: Christopher      Retrieval method: transvaginal      Needle inserted: Yes      Ovaries aspirated: Yes      Free fluid in pelvis: No    Post-procedure:     Patient tolerated procedure well: yes    Comments:      Preop diagnosis: Female infertility   Post op diagnosis: Same  Assistant: None    IV Fluids: 600  cc  EBL: 5  cc  UOP: Not recorded    Specimen: Oocytes  Complications: None    Number of Oocytes right ovary: 4   Ovarian access (right): Easy  Number of Oocytes left ovary: 1 (prelim)  Ovarian access (left): Easy  Endometrial thickness: TL with polyp noted  Needle type: Single  Additional notes:     I performed the procedure without a resident or fellow.    Zamzam Espinosa 07/10/25 9:44 AM

## 2025-07-10 NOTE — NURSING NOTE
Patient discharged to home in stable condition via wheelchair to [facility]: Friend's car. Discharge instructions given and concerns addressed.

## 2025-07-21 ENCOUNTER — TELEPHONE (OUTPATIENT)
Dept: ENDOCRINOLOGY | Facility: CLINIC | Age: 40
End: 2025-07-21
Payer: COMMERCIAL

## 2025-07-21 NOTE — TELEPHONE ENCOUNTER
Called the patient she verified her name and date of birth I advised we do these procedures day 5-11 I advised we can only give 48 hours notice I advised it is done between 8-10 Her period started 7-19-25 Patient verbalized an understanding     OLE BUENROSTRO 07/21/25 10:20 AM

## 2025-07-21 NOTE — TELEPHONE ENCOUNTER
----- Message from Long CRISTINA sent at 7/21/2025 10:06 AM EDT -----  Regarding: Polypectomy  Patient called to schedule Polypectomy. LMP 7/19. Patient would like to speak with RN to discuss her availability.

## 2025-07-26 ENCOUNTER — TELEPHONE (OUTPATIENT)
Dept: ENDOCRINOLOGY | Facility: CLINIC | Age: 40
End: 2025-07-26
Payer: COMMERCIAL

## 2025-07-26 NOTE — TELEPHONE ENCOUNTER
Called the patient she verified her name and date of birth I advised procedure would be Monday at 0830 I advised arrival is 0730 I advised nothing to eat or drink after midnight tomorrow night I advised she would need someone to bring her and take her home Patient verbalized an understanding    OLE BUENROSTRO 07/26/25 1:01 PM

## 2025-07-27 ENCOUNTER — PREP FOR PROCEDURE (OUTPATIENT)
Dept: ENDOCRINOLOGY | Facility: CLINIC | Age: 40
End: 2025-07-27
Payer: COMMERCIAL

## 2025-07-27 RX ORDER — KETOROLAC TROMETHAMINE 30 MG/ML
30 INJECTION, SOLUTION INTRAMUSCULAR; INTRAVENOUS ONCE AS NEEDED
Status: CANCELLED | OUTPATIENT
Start: 2025-07-27 | End: 2025-08-01

## 2025-07-27 RX ORDER — KETOROLAC TROMETHAMINE 30 MG/ML
30 VIAL (ML) INJECTION ONCE
Status: CANCELLED | OUTPATIENT
Start: 2025-07-27 | End: 2025-07-27

## 2025-07-27 RX ORDER — HYDROCODONE BITARTRATE AND ACETAMINOPHEN 5; 325 MG/1; MG/1
1 TABLET ORAL ONCE AS NEEDED
Refills: 0 | Status: CANCELLED | OUTPATIENT
Start: 2025-07-27

## 2025-07-27 RX ORDER — OXYCODONE AND ACETAMINOPHEN 5; 325 MG/1; MG/1
1 TABLET ORAL EVERY 6 HOURS PRN
Refills: 0 | Status: CANCELLED | OUTPATIENT
Start: 2025-07-27

## 2025-07-27 RX ORDER — HYDROMORPHONE HYDROCHLORIDE 0.2 MG/ML
0.2 INJECTION INTRAMUSCULAR; INTRAVENOUS; SUBCUTANEOUS
Status: CANCELLED | OUTPATIENT
Start: 2025-07-27

## 2025-07-27 RX ORDER — ONDANSETRON HYDROCHLORIDE 2 MG/ML
4 INJECTION, SOLUTION INTRAVENOUS AS NEEDED
Status: CANCELLED | OUTPATIENT
Start: 2025-07-27

## 2025-07-27 RX ORDER — ACETAMINOPHEN 325 MG/1
650 TABLET ORAL ONCE AS NEEDED
Status: CANCELLED | OUTPATIENT
Start: 2025-07-27

## 2025-07-27 RX ORDER — MORPHINE SULFATE 2 MG/ML
2 INJECTION, SOLUTION INTRAMUSCULAR; INTRAVENOUS AS NEEDED
Refills: 0 | Status: CANCELLED | OUTPATIENT
Start: 2025-07-27

## 2025-07-28 ENCOUNTER — ANESTHESIA (OUTPATIENT)
Dept: ENDOCRINOLOGY | Facility: CLINIC | Age: 40
End: 2025-07-28
Payer: COMMERCIAL

## 2025-07-28 ENCOUNTER — ANESTHESIA EVENT (OUTPATIENT)
Dept: ENDOCRINOLOGY | Facility: CLINIC | Age: 40
End: 2025-07-28
Payer: COMMERCIAL

## 2025-07-28 ENCOUNTER — HOSPITAL ENCOUNTER (OUTPATIENT)
Dept: ENDOCRINOLOGY | Facility: CLINIC | Age: 40
Discharge: HOME | End: 2025-07-28
Payer: COMMERCIAL

## 2025-07-28 VITALS
HEART RATE: 57 BPM | RESPIRATION RATE: 18 BRPM | TEMPERATURE: 98.4 F | HEIGHT: 64 IN | DIASTOLIC BLOOD PRESSURE: 59 MMHG | BODY MASS INDEX: 22.43 KG/M2 | SYSTOLIC BLOOD PRESSURE: 92 MMHG | WEIGHT: 131.39 LBS | OXYGEN SATURATION: 99 %

## 2025-07-28 DIAGNOSIS — N84.0 ENDOMETRIAL POLYP: ICD-10-CM

## 2025-07-28 LAB — PREGNANCY TEST URINE, POC: NEGATIVE

## 2025-07-28 PROCEDURE — 88305 TISSUE EXAM BY PATHOLOGIST: CPT

## 2025-07-28 PROCEDURE — A58558 PR HYSTEROSCOPY,W/ENDO BX: Performed by: ANESTHESIOLOGIST ASSISTANT

## 2025-07-28 PROCEDURE — 81025 URINE PREGNANCY TEST: CPT | Mod: GC | Performed by: OBSTETRICS & GYNECOLOGY

## 2025-07-28 PROCEDURE — A58558 PR HYSTEROSCOPY,W/ENDO BX: Performed by: ANESTHESIOLOGY

## 2025-07-28 PROCEDURE — 2500000004 HC RX 250 GENERAL PHARMACY W/ HCPCS (ALT 636 FOR OP/ED): Performed by: ANESTHESIOLOGIST ASSISTANT

## 2025-07-28 PROCEDURE — 3700000002 HC GENERAL ANESTHESIA TIME - EACH INCREMENTAL 1 MINUTE

## 2025-07-28 PROCEDURE — 7100000010 HC PHASE TWO TIME - EACH INCREMENTAL 1 MINUTE

## 2025-07-28 PROCEDURE — 3700000001 HC GENERAL ANESTHESIA TIME - INITIAL BASE CHARGE

## 2025-07-28 PROCEDURE — 58558 HYSTEROSCOPY BIOPSY: CPT | Performed by: STUDENT IN AN ORGANIZED HEALTH CARE EDUCATION/TRAINING PROGRAM

## 2025-07-28 PROCEDURE — 7100000009 HC PHASE TWO TIME - INITIAL BASE CHARGE

## 2025-07-28 RX ORDER — FENTANYL CITRATE 50 UG/ML
INJECTION, SOLUTION INTRAMUSCULAR; INTRAVENOUS AS NEEDED
Status: DISCONTINUED | OUTPATIENT
Start: 2025-07-28 | End: 2025-07-28

## 2025-07-28 RX ORDER — KETOROLAC TROMETHAMINE 30 MG/ML
30 INJECTION, SOLUTION INTRAMUSCULAR; INTRAVENOUS ONCE AS NEEDED
Status: DISCONTINUED | OUTPATIENT
Start: 2025-07-28 | End: 2025-07-29 | Stop reason: HOSPADM

## 2025-07-28 RX ORDER — HYDROCODONE BITARTRATE AND ACETAMINOPHEN 5; 325 MG/1; MG/1
1 TABLET ORAL ONCE AS NEEDED
Status: DISCONTINUED | OUTPATIENT
Start: 2025-07-28 | End: 2025-07-29 | Stop reason: HOSPADM

## 2025-07-28 RX ORDER — PROPOFOL 10 MG/ML
INJECTION, EMULSION INTRAVENOUS AS NEEDED
Status: DISCONTINUED | OUTPATIENT
Start: 2025-07-28 | End: 2025-07-28

## 2025-07-28 RX ORDER — MORPHINE SULFATE 2 MG/ML
2 INJECTION, SOLUTION INTRAMUSCULAR; INTRAVENOUS AS NEEDED
Status: DISCONTINUED | OUTPATIENT
Start: 2025-07-28 | End: 2025-07-29 | Stop reason: HOSPADM

## 2025-07-28 RX ORDER — ACETAMINOPHEN 325 MG/1
650 TABLET ORAL ONCE AS NEEDED
Status: DISCONTINUED | OUTPATIENT
Start: 2025-07-28 | End: 2025-07-29 | Stop reason: HOSPADM

## 2025-07-28 RX ORDER — LIDOCAINE HYDROCHLORIDE 20 MG/ML
INJECTION, SOLUTION INFILTRATION; PERINEURAL AS NEEDED
Status: DISCONTINUED | OUTPATIENT
Start: 2025-07-28 | End: 2025-07-28

## 2025-07-28 RX ORDER — ONDANSETRON HYDROCHLORIDE 2 MG/ML
4 INJECTION, SOLUTION INTRAVENOUS AS NEEDED
Status: DISCONTINUED | OUTPATIENT
Start: 2025-07-28 | End: 2025-07-29 | Stop reason: HOSPADM

## 2025-07-28 RX ORDER — KETOROLAC TROMETHAMINE 30 MG/ML
INJECTION, SOLUTION INTRAMUSCULAR; INTRAVENOUS AS NEEDED
Status: DISCONTINUED | OUTPATIENT
Start: 2025-07-28 | End: 2025-07-28

## 2025-07-28 RX ORDER — OXYCODONE AND ACETAMINOPHEN 5; 325 MG/1; MG/1
1 TABLET ORAL EVERY 6 HOURS PRN
Status: DISCONTINUED | OUTPATIENT
Start: 2025-07-28 | End: 2025-07-29 | Stop reason: HOSPADM

## 2025-07-28 RX ORDER — ONDANSETRON HYDROCHLORIDE 2 MG/ML
INJECTION, SOLUTION INTRAVENOUS AS NEEDED
Status: DISCONTINUED | OUTPATIENT
Start: 2025-07-28 | End: 2025-07-28

## 2025-07-28 RX ORDER — MIDAZOLAM HYDROCHLORIDE 1 MG/ML
INJECTION, SOLUTION INTRAMUSCULAR; INTRAVENOUS AS NEEDED
Status: DISCONTINUED | OUTPATIENT
Start: 2025-07-28 | End: 2025-07-28

## 2025-07-28 RX ORDER — KETOROLAC TROMETHAMINE 30 MG/ML
30 VIAL (ML) INJECTION ONCE
Status: DISCONTINUED | OUTPATIENT
Start: 2025-07-28 | End: 2025-07-29 | Stop reason: HOSPADM

## 2025-07-28 RX ADMIN — SODIUM CHLORIDE, SODIUM LACTATE, POTASSIUM CHLORIDE, AND CALCIUM CHLORIDE: .6; .31; .03; .02 INJECTION, SOLUTION INTRAVENOUS at 08:29

## 2025-07-28 RX ADMIN — PROPOFOL 70 MG: 10 INJECTION, EMULSION INTRAVENOUS at 08:32

## 2025-07-28 RX ADMIN — MIDAZOLAM 2 MG: 1 INJECTION INTRAMUSCULAR; INTRAVENOUS at 08:32

## 2025-07-28 RX ADMIN — ONDANSETRON 4 MG: 2 INJECTION INTRAMUSCULAR; INTRAVENOUS at 08:36

## 2025-07-28 RX ADMIN — FENTANYL CITRATE 25 MCG: 50 INJECTION, SOLUTION INTRAMUSCULAR; INTRAVENOUS at 08:32

## 2025-07-28 RX ADMIN — KETOROLAC TROMETHAMINE 30 MG: 30 INJECTION, SOLUTION INTRAMUSCULAR; INTRAVENOUS at 08:43

## 2025-07-28 RX ADMIN — PROPOFOL 185 MCG/KG/MIN: 10 INJECTION, EMULSION INTRAVENOUS at 08:33

## 2025-07-28 RX ADMIN — DEXAMETHASONE SODIUM PHOSPHATE 4 MG: 4 INJECTION, SOLUTION INTRA-ARTICULAR; INTRALESIONAL; INTRAMUSCULAR; INTRAVENOUS; SOFT TISSUE at 08:36

## 2025-07-28 RX ADMIN — LIDOCAINE HYDROCHLORIDE 100 MG: 20 INJECTION, SOLUTION INFILTRATION; PERINEURAL at 08:32

## 2025-07-28 ASSESSMENT — COLUMBIA-SUICIDE SEVERITY RATING SCALE - C-SSRS
1. IN THE PAST MONTH, HAVE YOU WISHED YOU WERE DEAD OR WISHED YOU COULD GO TO SLEEP AND NOT WAKE UP?: NO
2. HAVE YOU ACTUALLY HAD ANY THOUGHTS OF KILLING YOURSELF?: NO
6. HAVE YOU EVER DONE ANYTHING, STARTED TO DO ANYTHING, OR PREPARED TO DO ANYTHING TO END YOUR LIFE?: NO

## 2025-07-28 ASSESSMENT — PAIN - FUNCTIONAL ASSESSMENT
PAIN_FUNCTIONAL_ASSESSMENT: 0-10
PAIN_FUNCTIONAL_ASSESSMENT: UNABLE TO SELF-REPORT

## 2025-07-28 ASSESSMENT — PAIN SCALES - GENERAL
PAINLEVEL_OUTOF10: 0 - NO PAIN

## 2025-07-28 NOTE — PROGRESS NOTES
IVF Procedure Area H&P    Ms. Zoe Wong is a 40 y.o. F who presents for hysteroscopic polypectomy under anesthesia.   Interval history reviewed and affirmed as up to date.      MedHx: Medical History[1]    SurgHx: Surgical History[2]    Meds: Medications Ordered Prior to Encounter[3]    Allergies: RX Allergies[4]    ROS: negative apart from what is indicated above    PE:   Gen: AA x 3   Resp: No labored breathing  Abdomen: soft, non-tender, non-distended    A/P: Ms. Zoe Wong is a 40 y.o. F who presents for above procedure under anesthesia in the IVF procedure area. Okay to proceed with above stated procedure.    Doreen Burton         [1]   Past Medical History:  Diagnosis Date    Anxiety     GERD (gastroesophageal reflux disease)     HPV (human papilloma virus) infection     Insomnia     Migraines     Personal history of urinary (tract) infections     Seasonal allergies    [2]   Past Surgical History:  Procedure Laterality Date    ADENOIDECTOMY      APPENDECTOMY      BREAST BIOPSY      DILATION AND CURETTAGE OF UTERUS      HEMORRHOID SURGERY      OTHER SURGICAL HISTORY N/A 07/10/2025    Egg Retrieval   [3]   Current Outpatient Medications on File Prior to Encounter   Medication Sig Dispense Refill    propranolol (Inderal) 20 mg tablet TAKE 1 TABLET BY MOUTH EVERY DAY 90 tablet 1    chorionic gonadotropin (Pregnyl) 10,000 unit injection Reconstitute according to instructions and inject 10,000 units (1 mL) under the skin as a one time dose, as directed per provider for trigger. (Patient not taking: Reported on 7/10/2025) 1 each 0    follitropin beta (Follistim AQ) 900 unit/1.08 mL injection Inject 300 Units under the skin once daily in the evening. Inject under the skin as directed by provider (Patient not taking: Reported on 7/10/2025) 3 each 2    ganirelix (Orgalutran) 250 mcg/0.5 mL syringe injection Inject 0.5 mL (250 mcg) under the skin once daily in the morning. Inject 250 mcg (1 syringe) under the  skin once daily as directed per provider. (Patient not taking: Reported on 7/10/2025) 5 each 2    leuprolide (Lupron) 1 mg/0.2 mL injection Using an insulin syringe, draw up 80 units and inject under the skin as a one time dose, as directed per provider for trigger. (Patient not taking: Reported on 7/10/2025) 1 kit 0    loratadine (CLARITIN ORAL) Take 1 tablet by mouth once daily as needed. (Patient not taking: Reported on 7/28/2025)      menotropins (Menopur) 75 unit recon soln injection Reconstitute and inject 150 Units under the skin once daily in the evening. Inject under the skin as directed by provider (Patient not taking: Reported on 7/10/2025) 20 each 2    norgestimate-ethinyl estradiol (Ortho-Cyclen) 0.25-0.035 mg tablet Take 1 tablet by mouth once daily. Take active pills only as directed per provider (Patient not taking: Reported on 7/10/2025) 28 tablet 2    pen injector, for follitropin, pen injector Inject 1 Pen under the skin once daily. Use as directed to administer Follistrim (Patient not taking: Reported on 7/10/2025) 1 each 0    ZOLMitriptan (Zomig) 5 mg tablet Take 1 tablet (5 mg) by mouth 1 time if needed. (Patient not taking: Reported on 7/10/2025)       No current facility-administered medications on file prior to encounter.   [4]   Allergies  Allergen Reactions    Bee Pollen Unknown

## 2025-07-28 NOTE — PROGRESS NOTES
Patient ID: Zeo Wong is a 40 y.o. female.    Polypectomy    Date/Time: 7/28/2025 8:30 AM    Performed by: Doreen Burton MD  Authorized by: Rizwana Prince MD    Consent:     Consent obtained:  Written    Consent given by:  Patient    Risks discussed:  Bleeding, infection and pain    Alternatives discussed:  No treatment  Universal protocol:     Procedure explained and questions answered to patient or proxy's satisfaction: yes      Relevant documents present and verified: yes      Test results available: yes      Imaging studies available: yes      Immediately prior to procedure, a time out was called: yes      Patient identity confirmed:  Verbally with patient and arm band  Pre-procedure details:     Skin preparation:  Povidone-iodine  Sedation:     Sedation type:  Moderate sedation  Anesthesia:     Anesthesia method:  None  Procedure specific details:      Preoperative Diagnosis: endometrial polyp  Postoperative Diagnosis: Same  Assistant: Resident       Procedure: Hysteroscopic polypectomy    Discussed risks, benefits, alternatives and potential complications of surgical management. Written Informed Consent was obtained prior to the procedure and is detailed in the patient's record. Urine pregnancy test was performed and was negative. Time out was performed.    Anesthesia: MAC  Paracervical block with 1% lidocaine: No  Tenaculum: Yes  Cervical dilation: Yes  Estimated Blood Loss: 0 cc  Specimens: endometrial polyp  IV Fluids: 750 cc   Hysteroscopic fluid deficit: negligible    Findings: small endometrial polyp near the right tubal ostia      PROCEDURE DETAILS:   Patient was taken to the operating room.  She was prepped and draped in the usual sterile fashion in dorsal lithotomy position. A speculum was placed within the vaginal canal and the cervix was visualized. Betadine swabs x 3 were used to cleanse the cervix. A single tooth tenaculum was placed on the anterior lip of the cervix. The cervix was  serially dilated.  The Aveta hysteroscope was then inserted into the uterine cavity under direct visualization using normal saline as a distension medium. Once entry into the uterine cavity was completed, the above findings were visualized. The resection device was used to the resect the tissue until the uterus appeared smooth with no residual pathology.   Additional procedure details:     Excellent hemostasis was then noted. The hysteroscope was then removed from the uterine cavity without complications. Tenaculum and speculum were removed. Patient tolerated the procedure well and was returned to the recovery room in stable condition.  All counts were correct x 2. The attending physician was present for the entire procedure.    Doreen Burton 07/28/25 8:30 AM  Post-procedure details:     Procedure completion:  Tolerated well, no immediate complications

## 2025-07-28 NOTE — ANESTHESIA PREPROCEDURE EVALUATION
Caller: Sterling Medina    Relationship: Self    Best call back number:     What medication are you requesting:     PATIENT REQUESTED    A NEW GLUCOSE METER KIT INCLUDING LANCETS AND TEST STRIPS UNDER PATIENT'S INSURANCE COVERAGE    If a prescription is needed, what is your preferred pharmacy and phone number:      [facility] - [place], [place]    TELEPHONE CONTACT:    [phone]    DR HOGAN   Patient: Zoe Wong    Procedure Information       Date/Time: 07/28/25 0830    Scheduled providers: Doreen Burton MD    Procedure: POLYPECTOMY    [facility]            Relevant Problems   Neuro   (+) Anxiety with flying      GI   (+) Gastroesophageal reflux disease   (+) Irritable bowel syndrome with constipation      HEENT   (+) Seasonal allergies       Clinical information reviewed:                   NPO Detail:  No data recorded     Physical Exam    Airway  Mallampati: II     Cardiovascular   Rhythm: regular  Rate: normal     Dental    Pulmonary    Abdominal                                                              Pre-Anesthesia Evaluation      Zoe Wong is a 40 y.o. female who presents for the above mentioned procedure due to Endometrial polyp [N84.0]       Medical History[1]  Surgical History[2]  Social History[3]  RX Allergies[4]  Current Medications[5]  Prior to Admission medications   Medication Sig Start Date End Date Taking? Authorizing Provider   chorionic gonadotropin (Pregnyl) 10,000 unit injection Reconstitute according to instructions and inject 10,000 units (1 mL) under the skin as a one time dose, as directed per provider for trigger.  Patient not taking: Reported on 7/10/2025 6/4/25   Rizwana Prince MD   follitropin beta (Follistim AQ) 900 unit/1.08 mL injection Inject 300 Units under the skin once daily in the evening. Inject under the skin as directed by provider  Patient not taking: Reported on 7/10/2025 6/4/25   Rizwana Prince MD   ganirelix (Orgalutran) 250 mcg/0.5 mL syringe injection Inject 0.5 mL (250 mcg) under the skin once daily in the morning. Inject 250 mcg (1 syringe) under the skin once daily as directed per provider.  Patient not taking: Reported on 7/10/2025 6/4/25   Rizwana Prince MD   leuprolide (Lupron) 1 mg/0.2 mL injection Using an insulin syringe, draw up 80 units and inject under the skin as a  "one time dose, as directed per provider for trigger.  Patient not taking: Reported on 7/10/2025 6/4/25   Rizwana Prince MD   loratadine (CLARITIN ORAL) Take 1 tablet by mouth once daily as needed.    Historical Provider, MD   menotropins (Menopur) 75 unit recon soln injection Reconstitute and inject 150 Units under the skin once daily in the evening. Inject under the skin as directed by provider  Patient not taking: Reported on 7/10/2025 6/4/25   Rizwana Prince MD   norgestimate-ethinyl estradiol (Ortho-Cyclen) 0.25-0.035 mg tablet Take 1 tablet by mouth once daily. Take active pills only as directed per provider  Patient not taking: Reported on 7/10/2025 5/16/25 5/16/26  Cathleen Payton MD   pen injector, for follitropin, pen injector Inject 1 Pen under the skin once daily. Use as directed to administer Follistrim  Patient not taking: Reported on 7/10/2025 6/4/25 6/4/26  Rizwana Prince MD   propranolol (Inderal) 20 mg tablet TAKE 1 TABLET BY MOUTH EVERY DAY 6/24/25   Winifred Barajas DO   ZOLMitriptan (Zomig) 5 mg tablet Take 1 tablet (5 mg) by mouth 1 time if needed.  Patient not taking: Reported on 7/10/2025 5/10/23   Historical Provider, MD     No medication comments found.   Visit Vitals  OB Status Having periods   Smoking Status Never     Lab Results   Component Value Date/Time    WBC 7.6 06/25/2025 0646    HGB 12.5 06/25/2025 0646    HCT 39.4 06/25/2025 0646     06/25/2025 0646    ABO O 03/25/2025 1128    LABRH NEG 03/25/2025 1128    ABSCRN POS 03/25/2025 1128     Lab Results   Component Value Date    TSH 2.03 05/06/2025    GLUCOSE 77 03/15/2023     03/15/2023    K 4.2 03/15/2023     03/15/2023    CREATININE 0.63 03/15/2023    BUN 12 03/15/2023    CO2 29 03/15/2023    AST 17 03/15/2023    ALT 14 03/15/2023     No results found for: \"STAPHMRSASCR\"  No results found for this or any previous visit (from the past 4464 hours).  No results found for: \"EF\"     Lab Results   Component " Value Date    PREGTESTUR Positive (A) 07/10/2025            .                Anesthesia Plan    History of general anesthesia?: yes  History of complications of general anesthesia?: no    ASA 2     MAC     intravenous induction   Anesthetic plan and risks discussed with patient.    Plan discussed with CRNA and CAA.           [1]   Past Medical History:  Diagnosis Date    Anxiety     GERD (gastroesophageal reflux disease)     HPV (human papilloma virus) infection     Insomnia     Migraines     Personal history of urinary (tract) infections     Seasonal allergies    [2]   Past Surgical History:  Procedure Laterality Date    ADENOIDECTOMY      APPENDECTOMY      BREAST BIOPSY      DILATION AND CURETTAGE OF UTERUS      HEMORRHOID SURGERY      OTHER SURGICAL HISTORY N/A 07/10/2025    Egg Retrieval   [3]   Social History  Tobacco Use    Smoking status: Never    Smokeless tobacco: Never   Vaping Use    Vaping status: Never Used   Substance Use Topics    Alcohol use: Yes     Alcohol/week: 2.0 standard drinks of alcohol     Types: 2 Glasses of wine per week     Comment: social    Drug use: Never   [4]   Allergies  Allergen Reactions    Bee Pollen Unknown   [5]   Current Outpatient Medications:     chorionic gonadotropin (Pregnyl) 10,000 unit injection, Reconstitute according to instructions and inject 10,000 units (1 mL) under the skin as a one time dose, as directed per provider for trigger. (Patient not taking: Reported on 7/10/2025), Disp: 1 each, Rfl: 0    follitropin beta (Follistim AQ) 900 unit/1.08 mL injection, Inject 300 Units under the skin once daily in the evening. Inject under the skin as directed by provider (Patient not taking: Reported on 7/10/2025), Disp: 3 each, Rfl: 2    ganirelix (Orgalutran) 250 mcg/0.5 mL syringe injection, Inject 0.5 mL (250 mcg) under the skin once daily in the morning. Inject 250 mcg (1 syringe) under the skin once daily as directed per provider. (Patient not taking: Reported on  7/10/2025), Disp: 5 each, Rfl: 2    leuprolide (Lupron) 1 mg/0.2 mL injection, Using an insulin syringe, draw up 80 units and inject under the skin as a one time dose, as directed per provider for trigger. (Patient not taking: Reported on 7/10/2025), Disp: 1 kit, Rfl: 0    loratadine (CLARITIN ORAL), Take 1 tablet by mouth once daily as needed., Disp: , Rfl:     menotropins (Menopur) 75 unit recon soln injection, Reconstitute and inject 150 Units under the skin once daily in the evening. Inject under the skin as directed by provider (Patient not taking: Reported on 7/10/2025), Disp: 20 each, Rfl: 2    norgestimate-ethinyl estradiol (Ortho-Cyclen) 0.25-0.035 mg tablet, Take 1 tablet by mouth once daily. Take active pills only as directed per provider (Patient not taking: Reported on 7/10/2025), Disp: 28 tablet, Rfl: 2    pen injector, for follitropin, pen injector, Inject 1 Pen under the skin once daily. Use as directed to administer Follistrim (Patient not taking: Reported on 7/10/2025), Disp: 1 each, Rfl: 0    propranolol (Inderal) 20 mg tablet, TAKE 1 TABLET BY MOUTH EVERY DAY, Disp: 90 tablet, Rfl: 1    ZOLMitriptan (Zomig) 5 mg tablet, Take 1 tablet (5 mg) by mouth 1 time if needed. (Patient not taking: Reported on 7/10/2025), Disp: , Rfl:

## 2025-07-28 NOTE — DISCHARGE INSTRUCTIONS
[facility]  [address]. [place]  [address]  Tel: [phone]   Fax: [phone]    Home Going Instructions after Polypectomy:    Activity:   We do not recommend any exercise on the day of your polypectomy.  You may return to work the following day.  You may have light bleeding or spotting for several days after polypectomy.   Use only sanitary pads for bleeding, do not use tampons until you have no further bleeding.   Please abstain from intercourse until you have no further bleeding.     Anesthesia:  Drink small amounts of liquids initially and then slowly increase your intake of food. Drinking fluids will keep your bowels regular.   Avoid foods that are sweet, spicy or hard to digest today.  If you feel nauseated, rest your stomach for one hour, and then try drinking clear liquids again.  You may take a stool softener or miralax/milk of magnesia to help with constipation that may occur after anesthesia.  Please make sure a responsible adult is with you for at least 24 hours after surgery and do not drive or make important decisions during this time. Anesthesia may affect your judgment, coordination, and reaction time.    Pain:  If you experience any post-op pain, pain can be managed by taking Ibuprofen and/or Tylenol.    Follow up:  Follow up with your primary nurse a few days after your procedure to check in and make sure you know what your next steps are.   A post-operative visit with your physician is not necessary after polypectomy.    When to call your provider:  Vaginal bleeding that is more than a normal period that doesn't taper down.  Bleeding through 1 sanitary pad an hour.  Any clots the size of an egg or bigger.  Fever of 100.4 or higher with chills.  Unusual or foul smelling discharge.  Worsening abdominal pain.

## 2025-07-28 NOTE — NURSING NOTE
Patient discharged to home in stable condition via wheelchair to [facility]: Friend's car. Discharge instructions given and concerns addressed. Pt states she does not need to void before discharge, instructed that pt needs to void within 6 hours of procedure. Pt agreeable.  Hilda Mehta RN 07/28/25 9:51 AM

## 2025-07-28 NOTE — ANESTHESIA POSTPROCEDURE EVALUATION
Patient: Zoe Wong    Procedure Summary       Date: 07/28/25 Room / Location: [facility]; [facility]    Anesthesia Start: 0829 Anesthesia Stop: 0852    Procedure: POLYPECTOMY Diagnosis: Endometrial polyp    Scheduled Providers: Doreen Burton MD; Nathan Long MD Responsible Provider: Nathan Long MD    Anesthesia Type: MAC ASA Status: 2            Anesthesia Type: MAC    Vitals Value Taken Time   BP 92/56 07/28/25 09:06   Temp  07/28/25 09:10   Pulse 71 07/28/25 09:06   Resp 15 07/28/25 09:06   SpO2 97 % 07/28/25 09:06       Anesthesia Post Evaluation    Patient location during evaluation: bedside  Patient participation: complete - patient participated  Level of consciousness: awake  Pain management: adequate  Airway patency: patent  Cardiovascular status: acceptable  Respiratory status: acceptable  Hydration status: acceptable  Postoperative Nausea and Vomiting: none        No notable events documented.

## 2025-07-30 LAB
LABORATORY COMMENT REPORT: NORMAL
PATH REPORT.FINAL DX SPEC: NORMAL
PATH REPORT.GROSS SPEC: NORMAL
PATH REPORT.RELEVANT HX SPEC: NORMAL
PATH REPORT.TOTAL CANCER: NORMAL

## 2025-08-26 ENCOUNTER — TELEPHONE (OUTPATIENT)
Dept: ENDOCRINOLOGY | Facility: CLINIC | Age: 40
End: 2025-08-26
Payer: COMMERCIAL

## 2025-08-27 ENCOUNTER — PATIENT MESSAGE (OUTPATIENT)
Dept: PRIMARY CARE | Facility: CLINIC | Age: 40
End: 2025-08-27
Payer: COMMERCIAL

## 2025-08-27 ENCOUNTER — TELEPHONE (OUTPATIENT)
Dept: OBSTETRICS AND GYNECOLOGY | Facility: CLINIC | Age: 40
End: 2025-08-27
Payer: COMMERCIAL

## 2025-08-27 DIAGNOSIS — Z12.31 ENCOUNTER FOR SCREENING MAMMOGRAM FOR MALIGNANT NEOPLASM OF BREAST: ICD-10-CM

## 2025-08-27 DIAGNOSIS — Z12.31 ENCOUNTER FOR SCREENING MAMMOGRAM FOR MALIGNANT NEOPLASM OF BREAST: Primary | ICD-10-CM

## 2025-08-29 ENCOUNTER — HOSPITAL ENCOUNTER (OUTPATIENT)
Dept: RADIOLOGY | Facility: HOSPITAL | Age: 40
Discharge: HOME | End: 2025-08-29
Payer: COMMERCIAL

## 2025-08-29 DIAGNOSIS — Z12.31 ENCOUNTER FOR SCREENING MAMMOGRAM FOR MALIGNANT NEOPLASM OF BREAST: ICD-10-CM

## 2025-08-29 PROCEDURE — 77067 SCR MAMMO BI INCL CAD: CPT

## 2025-09-04 DIAGNOSIS — N97.9 FEMALE INFERTILITY: ICD-10-CM

## 2025-09-04 RX ORDER — PROPYLENE GLYCOL/PEG 400 0.3 %-0.4%
DROPS OPHTHALMIC (EYE)
Qty: 30 EACH | Refills: 3 | Status: SHIPPED | OUTPATIENT
Start: 2025-09-04 | End: 2025-12-02

## 2025-09-05 ENCOUNTER — LAB (OUTPATIENT)
Dept: LAB | Facility: HOSPITAL | Age: 40
End: 2025-09-05
Payer: COMMERCIAL

## 2025-09-06 LAB — COTININE UR QL SCN: NEGATIVE

## 2026-01-21 ENCOUNTER — APPOINTMENT (OUTPATIENT)
Dept: NEUROLOGY | Facility: CLINIC | Age: 41
End: 2026-01-21
Payer: COMMERCIAL

## 2026-03-16 ENCOUNTER — APPOINTMENT (OUTPATIENT)
Dept: PRIMARY CARE | Facility: CLINIC | Age: 41
End: 2026-03-16
Payer: COMMERCIAL